# Patient Record
Sex: FEMALE | Race: ASIAN | NOT HISPANIC OR LATINO | Employment: FULL TIME | URBAN - METROPOLITAN AREA
[De-identification: names, ages, dates, MRNs, and addresses within clinical notes are randomized per-mention and may not be internally consistent; named-entity substitution may affect disease eponyms.]

---

## 2017-02-17 ENCOUNTER — OFFICE VISIT - HEALTHEAST (OUTPATIENT)
Dept: FAMILY MEDICINE | Facility: CLINIC | Age: 22
End: 2017-02-17

## 2017-02-17 DIAGNOSIS — M54.9 LEFT-SIDED BACK PAIN: ICD-10-CM

## 2017-02-17 DIAGNOSIS — D62 ACUTE BLOOD LOSS ANEMIA: ICD-10-CM

## 2017-02-17 ASSESSMENT — MIFFLIN-ST. JEOR: SCORE: 1429.01

## 2017-02-27 ENCOUNTER — COMMUNICATION - HEALTHEAST (OUTPATIENT)
Dept: FAMILY MEDICINE | Facility: CLINIC | Age: 22
End: 2017-02-27

## 2017-04-03 ENCOUNTER — AMBULATORY - HEALTHEAST (OUTPATIENT)
Dept: FAMILY MEDICINE | Facility: CLINIC | Age: 22
End: 2017-04-03

## 2017-04-03 ENCOUNTER — AMBULATORY - HEALTHEAST (OUTPATIENT)
Dept: NURSING | Facility: CLINIC | Age: 22
End: 2017-04-03

## 2017-04-03 DIAGNOSIS — Z11.1 SCREENING FOR TUBERCULOSIS: ICD-10-CM

## 2017-04-03 DIAGNOSIS — Z23 NEED FOR IMMUNIZATION AGAINST INFLUENZA: ICD-10-CM

## 2017-04-05 ENCOUNTER — AMBULATORY - HEALTHEAST (OUTPATIENT)
Dept: NURSING | Facility: CLINIC | Age: 22
End: 2017-04-05

## 2017-04-05 ENCOUNTER — COMMUNICATION - HEALTHEAST (OUTPATIENT)
Dept: FAMILY MEDICINE | Facility: CLINIC | Age: 22
End: 2017-04-05

## 2017-08-10 ENCOUNTER — COMMUNICATION - HEALTHEAST (OUTPATIENT)
Dept: FAMILY MEDICINE | Facility: CLINIC | Age: 22
End: 2017-08-10

## 2017-08-21 ENCOUNTER — PRENATAL OFFICE VISIT - HEALTHEAST (OUTPATIENT)
Dept: FAMILY MEDICINE | Facility: CLINIC | Age: 22
End: 2017-08-21

## 2017-08-21 DIAGNOSIS — Z32.01 POSITIVE PREGNANCY TEST: ICD-10-CM

## 2017-08-21 DIAGNOSIS — N92.6 ABNORMAL MENSES: ICD-10-CM

## 2017-08-21 DIAGNOSIS — Z34.90 NORMAL PREGNANCY: ICD-10-CM

## 2017-08-21 LAB — HIV 1+2 AB+HIV1 P24 AG SERPL QL IA: NEGATIVE

## 2017-08-21 ASSESSMENT — MIFFLIN-ST. JEOR: SCORE: 1465.29

## 2017-08-22 ENCOUNTER — HOSPITAL ENCOUNTER (OUTPATIENT)
Dept: ULTRASOUND IMAGING | Facility: HOSPITAL | Age: 22
Discharge: HOME | End: 2017-08-22
Attending: PHYSICIAN ASSISTANT

## 2017-08-22 DIAGNOSIS — Z34.90 NORMAL PREGNANCY: ICD-10-CM

## 2017-08-22 DIAGNOSIS — Z32.01 POSITIVE PREGNANCY TEST: ICD-10-CM

## 2017-08-22 LAB
HBV SURFACE AG SERPL QL IA: NEGATIVE
SYPHILIS RPR SCREEN - HISTORICAL: NORMAL

## 2017-08-23 ENCOUNTER — COMMUNICATION - HEALTHEAST (OUTPATIENT)
Dept: FAMILY MEDICINE | Facility: CLINIC | Age: 22
End: 2017-08-23

## 2017-08-26 ENCOUNTER — AMBULATORY - HEALTHEAST (OUTPATIENT)
Dept: FAMILY MEDICINE | Facility: CLINIC | Age: 22
End: 2017-08-26

## 2017-09-19 ENCOUNTER — PRENATAL OFFICE VISIT - HEALTHEAST (OUTPATIENT)
Dept: FAMILY MEDICINE | Facility: CLINIC | Age: 22
End: 2017-09-19

## 2017-09-19 DIAGNOSIS — Z34.92 SUPERVISION OF NORMAL PREGNANCY IN SECOND TRIMESTER: ICD-10-CM

## 2017-09-19 DIAGNOSIS — O99.891 ASYMPTOMATIC BACTERIURIA DURING PREGNANCY IN FIRST TRIMESTER: ICD-10-CM

## 2017-09-19 DIAGNOSIS — L30.9 ECZEMA: ICD-10-CM

## 2017-09-19 DIAGNOSIS — R82.71 ASYMPTOMATIC BACTERIURIA DURING PREGNANCY IN FIRST TRIMESTER: ICD-10-CM

## 2017-10-24 ENCOUNTER — HOSPITAL ENCOUNTER (OUTPATIENT)
Dept: ULTRASOUND IMAGING | Facility: HOSPITAL | Age: 22
Discharge: HOME | End: 2017-10-24
Attending: FAMILY MEDICINE

## 2017-10-24 ENCOUNTER — PRENATAL OFFICE VISIT - HEALTHEAST (OUTPATIENT)
Dept: FAMILY MEDICINE | Facility: CLINIC | Age: 22
End: 2017-10-24

## 2017-10-24 DIAGNOSIS — Z34.92 SUPERVISION OF NORMAL PREGNANCY IN SECOND TRIMESTER: ICD-10-CM

## 2017-10-24 DIAGNOSIS — Z34.82 ENCOUNTER FOR SUPERVISION OF OTHER NORMAL PREGNANCY IN SECOND TRIMESTER: ICD-10-CM

## 2017-11-21 ENCOUNTER — PRENATAL OFFICE VISIT - HEALTHEAST (OUTPATIENT)
Dept: FAMILY MEDICINE | Facility: CLINIC | Age: 22
End: 2017-11-21

## 2017-11-21 DIAGNOSIS — Z34.92 SUPERVISION OF NORMAL PREGNANCY IN SECOND TRIMESTER: ICD-10-CM

## 2017-12-22 ENCOUNTER — PRENATAL OFFICE VISIT - HEALTHEAST (OUTPATIENT)
Dept: FAMILY MEDICINE | Facility: CLINIC | Age: 22
End: 2017-12-22

## 2017-12-22 DIAGNOSIS — Z34.93 SUPERVISION OF NORMAL PREGNANCY IN THIRD TRIMESTER: ICD-10-CM

## 2017-12-23 LAB — SYPHILIS RPR SCREEN - HISTORICAL: NORMAL

## 2018-01-23 ENCOUNTER — PRENATAL OFFICE VISIT - HEALTHEAST (OUTPATIENT)
Dept: FAMILY MEDICINE | Facility: CLINIC | Age: 23
End: 2018-01-23

## 2018-01-23 DIAGNOSIS — Z34.93 SUPERVISION OF NORMAL PREGNANCY IN THIRD TRIMESTER: ICD-10-CM

## 2018-01-23 LAB
ALBUMIN UR-MCNC: NEGATIVE MG/DL
APPEARANCE UR: CLEAR
BILIRUB UR QL STRIP: NEGATIVE
COLOR UR AUTO: YELLOW
GLUCOSE UR STRIP-MCNC: NEGATIVE MG/DL
HGB UR QL STRIP: NEGATIVE
KETONES UR STRIP-MCNC: NEGATIVE MG/DL
LEUKOCYTE ESTERASE UR QL STRIP: ABNORMAL
NITRATE UR QL: NEGATIVE
PH UR STRIP: 7 [PH] (ref 5–8)
SP GR UR STRIP: 1.02 (ref 1–1.03)
UROBILINOGEN UR STRIP-ACNC: ABNORMAL

## 2018-02-13 ENCOUNTER — PRENATAL OFFICE VISIT - HEALTHEAST (OUTPATIENT)
Dept: FAMILY MEDICINE | Facility: CLINIC | Age: 23
End: 2018-02-13

## 2018-02-13 DIAGNOSIS — Z34.93 SUPERVISION OF NORMAL PREGNANCY IN THIRD TRIMESTER: ICD-10-CM

## 2018-02-13 LAB
ALBUMIN UR-MCNC: NEGATIVE MG/DL
APPEARANCE UR: CLEAR
BILIRUB UR QL STRIP: NEGATIVE
COLOR UR AUTO: YELLOW
GLUCOSE UR STRIP-MCNC: NEGATIVE MG/DL
HGB UR QL STRIP: NEGATIVE
KETONES UR STRIP-MCNC: NEGATIVE MG/DL
LEUKOCYTE ESTERASE UR QL STRIP: ABNORMAL
NITRATE UR QL: NEGATIVE
PH UR STRIP: 6.5 [PH] (ref 5–8)
SP GR UR STRIP: 1.02 (ref 1–1.03)
UROBILINOGEN UR STRIP-ACNC: ABNORMAL

## 2018-02-14 LAB
ALLERGIC TO PENICILLIN: NO
GP B STREP DNA SPEC QL NAA+PROBE: NEGATIVE

## 2018-02-27 ENCOUNTER — PRENATAL OFFICE VISIT - HEALTHEAST (OUTPATIENT)
Dept: FAMILY MEDICINE | Facility: CLINIC | Age: 23
End: 2018-02-27

## 2018-02-27 DIAGNOSIS — Z34.93 SUPERVISION OF NORMAL PREGNANCY IN THIRD TRIMESTER: ICD-10-CM

## 2018-02-27 LAB
ALBUMIN UR-MCNC: NEGATIVE MG/DL
APPEARANCE UR: CLEAR
BILIRUB UR QL STRIP: NEGATIVE
COLOR UR AUTO: YELLOW
GLUCOSE UR STRIP-MCNC: NEGATIVE MG/DL
HGB UR QL STRIP: NEGATIVE
KETONES UR STRIP-MCNC: NEGATIVE MG/DL
LEUKOCYTE ESTERASE UR QL STRIP: ABNORMAL
NITRATE UR QL: NEGATIVE
PH UR STRIP: 6 [PH] (ref 5–8)
SP GR UR STRIP: 1.02 (ref 1–1.03)
UROBILINOGEN UR STRIP-ACNC: ABNORMAL

## 2018-03-06 ENCOUNTER — PRENATAL OFFICE VISIT - HEALTHEAST (OUTPATIENT)
Dept: FAMILY MEDICINE | Facility: CLINIC | Age: 23
End: 2018-03-06

## 2018-03-06 DIAGNOSIS — Z34.93 SUPERVISION OF NORMAL PREGNANCY IN THIRD TRIMESTER: ICD-10-CM

## 2018-03-15 ENCOUNTER — COMMUNICATION - HEALTHEAST (OUTPATIENT)
Dept: FAMILY MEDICINE | Facility: CLINIC | Age: 23
End: 2018-03-15

## 2018-03-22 ENCOUNTER — PRENATAL OFFICE VISIT - HEALTHEAST (OUTPATIENT)
Dept: FAMILY MEDICINE | Facility: CLINIC | Age: 23
End: 2018-03-22

## 2018-03-22 DIAGNOSIS — Z34.93 SUPERVISION OF NORMAL PREGNANCY IN THIRD TRIMESTER: ICD-10-CM

## 2018-03-22 LAB
ALBUMIN UR-MCNC: ABNORMAL MG/DL
APPEARANCE UR: CLEAR
BILIRUB UR QL STRIP: NEGATIVE
COLOR UR AUTO: YELLOW
GLUCOSE UR STRIP-MCNC: NEGATIVE MG/DL
HGB UR QL STRIP: NEGATIVE
KETONES UR STRIP-MCNC: NEGATIVE MG/DL
LEUKOCYTE ESTERASE UR QL STRIP: ABNORMAL
NITRATE UR QL: NEGATIVE
PH UR STRIP: 6.5 [PH] (ref 5–8)
SP GR UR STRIP: 1.02 (ref 1–1.03)
UROBILINOGEN UR STRIP-ACNC: ABNORMAL

## 2018-03-23 ENCOUNTER — HOME CARE/HOSPICE - HEALTHEAST (OUTPATIENT)
Dept: HOME HEALTH SERVICES | Facility: HOME HEALTH | Age: 23
End: 2018-03-23

## 2018-03-25 ENCOUNTER — HOME CARE/HOSPICE - HEALTHEAST (OUTPATIENT)
Dept: HOME HEALTH SERVICES | Facility: HOME HEALTH | Age: 23
End: 2018-03-25

## 2018-05-07 ENCOUNTER — OFFICE VISIT - HEALTHEAST (OUTPATIENT)
Dept: FAMILY MEDICINE | Facility: CLINIC | Age: 23
End: 2018-05-07

## 2018-05-07 DIAGNOSIS — Z12.4 CERVICAL CANCER SCREENING: ICD-10-CM

## 2018-05-09 ENCOUNTER — OFFICE VISIT - HEALTHEAST (OUTPATIENT)
Dept: FAMILY MEDICINE | Facility: CLINIC | Age: 23
End: 2018-05-09

## 2018-05-09 DIAGNOSIS — Z30.017 NEXPLANON INSERTION: ICD-10-CM

## 2018-05-09 LAB
BKR LAB AP ABNORMAL BLEEDING: NO
BKR LAB AP BIRTH CONTROL/HORMONES: NORMAL
BKR LAB AP CERVICAL APPEARANCE: NORMAL
BKR LAB AP GYN ADEQUACY: NORMAL
BKR LAB AP GYN INTERPRETATION: NORMAL
BKR LAB AP HPV REFLEX: NORMAL
BKR LAB AP LMP: NORMAL
BKR LAB AP PATIENT STATUS: NORMAL
BKR LAB AP PREVIOUS ABNORMAL: NORMAL
BKR LAB AP PREVIOUS NORMAL: NORMAL
HCG UR QL: NEGATIVE
HIGH RISK?: NO
PATH REPORT.COMMENTS IMP SPEC: NORMAL
RESULT FLAG (HE HISTORICAL CONVERSION): NORMAL
SP GR UR STRIP: <=1.005 (ref 1–1.03)

## 2019-04-09 ENCOUNTER — COMMUNICATION - HEALTHEAST (OUTPATIENT)
Dept: FAMILY MEDICINE | Facility: CLINIC | Age: 24
End: 2019-04-09

## 2019-08-13 ENCOUNTER — OFFICE VISIT - HEALTHEAST (OUTPATIENT)
Dept: FAMILY MEDICINE | Facility: CLINIC | Age: 24
End: 2019-08-13

## 2019-08-13 DIAGNOSIS — Z30.46 NEXPLANON REMOVAL: ICD-10-CM

## 2019-08-13 ASSESSMENT — MIFFLIN-ST. JEOR: SCORE: 1537.08

## 2020-02-10 ENCOUNTER — OFFICE VISIT - HEALTHEAST (OUTPATIENT)
Dept: FAMILY MEDICINE | Facility: CLINIC | Age: 25
End: 2020-02-10

## 2020-02-10 DIAGNOSIS — M54.50 ACUTE MIDLINE LOW BACK PAIN WITHOUT SCIATICA: ICD-10-CM

## 2020-02-19 ENCOUNTER — OFFICE VISIT - HEALTHEAST (OUTPATIENT)
Dept: FAMILY MEDICINE | Facility: CLINIC | Age: 25
End: 2020-02-19

## 2020-02-19 DIAGNOSIS — Z11.8 SPECIAL SCREENING EXAMINATION FOR CHLAMYDIAL DISEASE: ICD-10-CM

## 2020-02-19 DIAGNOSIS — Z23 NEED FOR INFLUENZA VACCINATION: ICD-10-CM

## 2020-02-19 DIAGNOSIS — Z00.00 WELL ADULT EXAM: ICD-10-CM

## 2020-02-19 DIAGNOSIS — Z30.011 ENCOUNTER FOR INITIAL PRESCRIPTION OF CONTRACEPTIVE PILLS: ICD-10-CM

## 2020-02-19 LAB — HCG UR QL: NEGATIVE

## 2020-02-20 LAB
C TRACH DNA SPEC QL PROBE+SIG AMP: NEGATIVE
N GONORRHOEA DNA SPEC QL NAA+PROBE: NEGATIVE

## 2020-08-05 ENCOUNTER — PRENATAL OFFICE VISIT - HEALTHEAST (OUTPATIENT)
Dept: FAMILY MEDICINE | Facility: CLINIC | Age: 25
End: 2020-08-05

## 2020-08-05 DIAGNOSIS — N92.6 ABNORMAL MENSES: ICD-10-CM

## 2020-08-05 DIAGNOSIS — Z3A.08 8 WEEKS GESTATION OF PREGNANCY: ICD-10-CM

## 2020-08-05 DIAGNOSIS — Z32.01 PREGNANCY TEST POSITIVE: ICD-10-CM

## 2020-08-05 LAB
ALBUMIN UR-MCNC: NEGATIVE MG/DL
AMPHETAMINES UR QL SCN: NORMAL
BARBITURATES UR QL: NORMAL
BASOPHILS # BLD AUTO: 0 THOU/UL (ref 0–0.2)
BASOPHILS NFR BLD AUTO: 0 % (ref 0–2)
BENZODIAZ UR QL: NORMAL
CANNABINOIDS UR QL SCN: NORMAL
COCAINE UR QL: NORMAL
CREAT UR-MCNC: 41.3 MG/DL
EOSINOPHIL # BLD AUTO: 0.1 THOU/UL (ref 0–0.4)
EOSINOPHIL NFR BLD AUTO: 1 % (ref 0–6)
ERYTHROCYTE [DISTWIDTH] IN BLOOD BY AUTOMATED COUNT: 12.3 % (ref 11–14.5)
GLUCOSE UR STRIP-MCNC: NEGATIVE MG/DL
HCG UR QL: POSITIVE
HCT VFR BLD AUTO: 39 % (ref 35–47)
HGB BLD-MCNC: 12.8 G/DL (ref 12–16)
HIV 1+2 AB+HIV1 P24 AG SERPL QL IA: NEGATIVE
KETONES UR STRIP-MCNC: NEGATIVE MG/DL
LYMPHOCYTES # BLD AUTO: 2.2 THOU/UL (ref 0.8–4.4)
LYMPHOCYTES NFR BLD AUTO: 22 % (ref 20–40)
MCH RBC QN AUTO: 27.5 PG (ref 27–34)
MCHC RBC AUTO-ENTMCNC: 32.8 G/DL (ref 32–36)
MCV RBC AUTO: 84 FL (ref 80–100)
MONOCYTES # BLD AUTO: 0.8 THOU/UL (ref 0–0.9)
MONOCYTES NFR BLD AUTO: 8 % (ref 2–10)
NEUTROPHILS # BLD AUTO: 7.1 THOU/UL (ref 2–7.7)
NEUTROPHILS NFR BLD AUTO: 69 % (ref 50–70)
OPIATES UR QL SCN: NORMAL
OXYCODONE UR QL: NORMAL
PCP UR QL SCN: NORMAL
PLATELET # BLD AUTO: 267 THOU/UL (ref 140–440)
PMV BLD AUTO: 9.5 FL (ref 8.5–12.5)
RBC # BLD AUTO: 4.65 MILL/UL (ref 3.8–5.4)
WBC: 10.3 THOU/UL (ref 4–11)

## 2020-08-05 ASSESSMENT — MIFFLIN-ST. JEOR: SCORE: 1514.62

## 2020-08-06 ENCOUNTER — HOSPITAL ENCOUNTER (OUTPATIENT)
Dept: ULTRASOUND IMAGING | Facility: HOSPITAL | Age: 25
Discharge: HOME | End: 2020-08-06
Attending: PHYSICIAN ASSISTANT

## 2020-08-06 DIAGNOSIS — Z32.01 PREGNANCY TEST POSITIVE: ICD-10-CM

## 2020-08-06 DIAGNOSIS — Z3A.08 8 WEEKS GESTATION OF PREGNANCY: ICD-10-CM

## 2020-08-06 LAB
ABO/RH(D): NORMAL
ABORH REPEAT: NORMAL
ANTIBODY SCREEN: NEGATIVE
HBV SURFACE AG SERPL QL IA: NEGATIVE
RUBV IGG SERPL QL IA: POSITIVE
T PALLIDUM AB SER QL: NEGATIVE

## 2020-08-07 ENCOUNTER — AMBULATORY - HEALTHEAST (OUTPATIENT)
Dept: FAMILY MEDICINE | Facility: CLINIC | Age: 25
End: 2020-08-07

## 2020-08-07 DIAGNOSIS — N39.0 URINARY TRACT INFECTION WITHOUT HEMATURIA, SITE UNSPECIFIED: ICD-10-CM

## 2020-08-07 LAB
BACTERIA SPEC CULT: ABNORMAL
C TRACH DNA SPEC QL PROBE+SIG AMP: NEGATIVE
N GONORRHOEA DNA SPEC QL NAA+PROBE: NEGATIVE

## 2020-08-08 LAB
COLLECTION METHOD: NORMAL
LEAD BLD-MCNC: NORMAL UG/DL
LEAD BLDV-MCNC: <2 UG/DL

## 2020-08-10 ENCOUNTER — COMMUNICATION - HEALTHEAST (OUTPATIENT)
Dept: FAMILY MEDICINE | Facility: CLINIC | Age: 25
End: 2020-08-10

## 2020-08-16 ENCOUNTER — COMMUNICATION - HEALTHEAST (OUTPATIENT)
Dept: FAMILY MEDICINE | Facility: CLINIC | Age: 25
End: 2020-08-16

## 2020-09-07 ENCOUNTER — COMMUNICATION - HEALTHEAST (OUTPATIENT)
Dept: FAMILY MEDICINE | Facility: CLINIC | Age: 25
End: 2020-09-07

## 2020-09-21 ENCOUNTER — PRENATAL OFFICE VISIT - HEALTHEAST (OUTPATIENT)
Dept: FAMILY MEDICINE | Facility: CLINIC | Age: 25
End: 2020-09-21

## 2020-09-21 DIAGNOSIS — Z34.92 SUPERVISION OF NORMAL PREGNANCY IN SECOND TRIMESTER: ICD-10-CM

## 2020-09-21 DIAGNOSIS — Z34.80 SUPERVISION OF OTHER NORMAL PREGNANCY, ANTEPARTUM: ICD-10-CM

## 2020-09-21 DIAGNOSIS — Z34.02 SUPERVISION OF NORMAL FIRST PREGNANCY IN SECOND TRIMESTER: ICD-10-CM

## 2020-09-21 LAB
ALBUMIN UR-MCNC: ABNORMAL MG/DL
GLUCOSE UR STRIP-MCNC: NEGATIVE MG/DL
KETONES UR STRIP-MCNC: ABNORMAL MG/DL

## 2020-09-22 LAB — BACTERIA SPEC CULT: NO GROWTH

## 2020-10-02 ENCOUNTER — COMMUNICATION - HEALTHEAST (OUTPATIENT)
Dept: FAMILY MEDICINE | Facility: CLINIC | Age: 25
End: 2020-10-02

## 2020-10-19 ENCOUNTER — PRENATAL OFFICE VISIT - HEALTHEAST (OUTPATIENT)
Dept: FAMILY MEDICINE | Facility: CLINIC | Age: 25
End: 2020-10-19

## 2020-10-19 DIAGNOSIS — Z34.92 ENCOUNTER FOR SUPERVISION OF NORMAL PREGNANCY IN SECOND TRIMESTER: ICD-10-CM

## 2020-10-19 DIAGNOSIS — L30.9 ECZEMA, UNSPECIFIED TYPE: ICD-10-CM

## 2020-10-19 DIAGNOSIS — N89.8 VAGINAL DISCHARGE DURING PREGNANCY IN SECOND TRIMESTER: ICD-10-CM

## 2020-10-19 DIAGNOSIS — O26.892 VAGINAL DISCHARGE DURING PREGNANCY IN SECOND TRIMESTER: ICD-10-CM

## 2020-10-19 DIAGNOSIS — Z23 NEED FOR INFLUENZA VACCINATION: ICD-10-CM

## 2020-10-19 DIAGNOSIS — B37.31 YEAST INFECTION OF THE VAGINA: ICD-10-CM

## 2020-10-19 LAB
ALBUMIN UR-MCNC: NEGATIVE MG/DL
CLUE CELLS: ABNORMAL
GLUCOSE UR STRIP-MCNC: NEGATIVE MG/DL
KETONES UR STRIP-MCNC: NEGATIVE MG/DL
TRICHOMONAS, WET PREP: ABNORMAL
YEAST, WET PREP: ABNORMAL

## 2020-10-20 LAB
C TRACH DNA SPEC QL PROBE+SIG AMP: NEGATIVE
N GONORRHOEA DNA SPEC QL NAA+PROBE: NEGATIVE

## 2020-10-29 ENCOUNTER — HOSPITAL ENCOUNTER (OUTPATIENT)
Dept: ULTRASOUND IMAGING | Facility: HOSPITAL | Age: 25
Discharge: HOME | End: 2020-10-29
Attending: FAMILY MEDICINE

## 2020-10-29 DIAGNOSIS — Z34.92 ENCOUNTER FOR SUPERVISION OF NORMAL PREGNANCY IN SECOND TRIMESTER: ICD-10-CM

## 2020-11-11 ENCOUNTER — HOSPITAL ENCOUNTER (OUTPATIENT)
Dept: OBGYN | Facility: HOSPITAL | Age: 25
Discharge: HOME | End: 2020-11-11
Attending: FAMILY MEDICINE | Admitting: FAMILY MEDICINE

## 2020-11-11 ENCOUNTER — COMMUNICATION - HEALTHEAST (OUTPATIENT)
Dept: SCHEDULING | Facility: CLINIC | Age: 25
End: 2020-11-11

## 2020-11-11 DIAGNOSIS — O23.42 URINARY TRACT INFECTION IN MOTHER DURING SECOND TRIMESTER OF PREGNANCY: ICD-10-CM

## 2020-11-11 LAB
ALBUMIN UR-MCNC: NEGATIVE MG/DL
APPEARANCE UR: CLEAR
BACTERIA #/AREA URNS HPF: ABNORMAL HPF
BILIRUB UR QL STRIP: NEGATIVE
COLOR UR AUTO: YELLOW
GLUCOSE UR STRIP-MCNC: NEGATIVE MG/DL
HGB UR QL STRIP: ABNORMAL
KETONES UR STRIP-MCNC: NEGATIVE MG/DL
LEUKOCYTE ESTERASE UR QL STRIP: ABNORMAL
NITRATE UR QL: NEGATIVE
PH UR STRIP: 7 [PH] (ref 4.5–8)
RBC #/AREA URNS AUTO: ABNORMAL HPF
SP GR UR STRIP: 1 (ref 1–1.03)
SQUAMOUS #/AREA URNS AUTO: ABNORMAL LPF
UROBILINOGEN UR STRIP-ACNC: ABNORMAL
WBC #/AREA URNS AUTO: ABNORMAL HPF
WBC CLUMPS #/AREA URNS HPF: PRESENT /[HPF]

## 2020-11-11 ASSESSMENT — MIFFLIN-ST. JEOR: SCORE: 1510.65

## 2020-11-13 ENCOUNTER — COMMUNICATION - HEALTHEAST (OUTPATIENT)
Dept: FAMILY MEDICINE | Facility: CLINIC | Age: 25
End: 2020-11-13

## 2020-11-13 LAB — BACTERIA SPEC CULT: ABNORMAL

## 2020-11-16 ENCOUNTER — OFFICE VISIT - HEALTHEAST (OUTPATIENT)
Dept: FAMILY MEDICINE | Facility: CLINIC | Age: 25
End: 2020-11-16

## 2020-11-16 DIAGNOSIS — B37.31 YEAST INFECTION OF THE VAGINA: ICD-10-CM

## 2020-11-16 DIAGNOSIS — N30.01 ACUTE CYSTITIS WITH HEMATURIA: ICD-10-CM

## 2020-11-16 DIAGNOSIS — O23.42 URINARY TRACT INFECTION IN MOTHER DURING SECOND TRIMESTER OF PREGNANCY: ICD-10-CM

## 2020-12-14 ENCOUNTER — PRENATAL OFFICE VISIT - HEALTHEAST (OUTPATIENT)
Dept: FAMILY MEDICINE | Facility: CLINIC | Age: 25
End: 2020-12-14

## 2020-12-14 DIAGNOSIS — Z34.92 SUPERVISION OF NORMAL PREGNANCY IN SECOND TRIMESTER: ICD-10-CM

## 2020-12-14 LAB
ALBUMIN UR-MCNC: NEGATIVE MG/DL
FASTING STATUS PATIENT QL REPORTED: NO
GLUCOSE 1H P 50 G GLC PO SERPL-MCNC: 77 MG/DL (ref 70–139)
GLUCOSE UR STRIP-MCNC: NEGATIVE MG/DL
HGB BLD-MCNC: 11.6 G/DL (ref 12–16)
KETONES UR STRIP-MCNC: NEGATIVE MG/DL

## 2020-12-15 LAB — T PALLIDUM AB SER QL: NEGATIVE

## 2021-01-11 ENCOUNTER — PRENATAL OFFICE VISIT - HEALTHEAST (OUTPATIENT)
Dept: FAMILY MEDICINE | Facility: CLINIC | Age: 26
End: 2021-01-11

## 2021-01-11 DIAGNOSIS — Z34.93 THIRD TRIMESTER PREGNANCY: ICD-10-CM

## 2021-01-11 LAB
ALBUMIN UR-MCNC: NEGATIVE MG/DL
GLUCOSE UR STRIP-MCNC: NEGATIVE MG/DL
KETONES UR STRIP-MCNC: NEGATIVE MG/DL

## 2021-02-08 ENCOUNTER — PRENATAL OFFICE VISIT - HEALTHEAST (OUTPATIENT)
Dept: FAMILY MEDICINE | Facility: CLINIC | Age: 26
End: 2021-02-08

## 2021-02-08 DIAGNOSIS — B37.31 VAGINAL YEAST INFECTION: ICD-10-CM

## 2021-02-08 DIAGNOSIS — Z34.93 SUPERVISION OF NORMAL PREGNANCY IN THIRD TRIMESTER: ICD-10-CM

## 2021-02-08 LAB
ALBUMIN UR-MCNC: ABNORMAL MG/DL
CLUE CELLS: ABNORMAL
GLUCOSE UR STRIP-MCNC: NEGATIVE MG/DL
KETONES UR STRIP-MCNC: NEGATIVE MG/DL
TRICHOMONAS, WET PREP: ABNORMAL
YEAST, WET PREP: ABNORMAL

## 2021-02-11 ENCOUNTER — COMMUNICATION - HEALTHEAST (OUTPATIENT)
Dept: FAMILY MEDICINE | Facility: CLINIC | Age: 26
End: 2021-02-11

## 2021-02-16 ENCOUNTER — PRENATAL OFFICE VISIT - HEALTHEAST (OUTPATIENT)
Dept: FAMILY MEDICINE | Facility: CLINIC | Age: 26
End: 2021-02-16

## 2021-02-16 DIAGNOSIS — Z34.93 SUPERVISION OF NORMAL PREGNANCY IN THIRD TRIMESTER: ICD-10-CM

## 2021-02-16 LAB
ALBUMIN UR-MCNC: ABNORMAL MG/DL
GLUCOSE UR STRIP-MCNC: NEGATIVE MG/DL
KETONES UR STRIP-MCNC: NEGATIVE MG/DL

## 2021-02-23 ENCOUNTER — HOSPITAL ENCOUNTER (OUTPATIENT)
Dept: ULTRASOUND IMAGING | Facility: HOSPITAL | Age: 26
Discharge: HOME | End: 2021-02-23
Attending: FAMILY MEDICINE

## 2021-02-23 ENCOUNTER — PRENATAL OFFICE VISIT - HEALTHEAST (OUTPATIENT)
Dept: FAMILY MEDICINE | Facility: CLINIC | Age: 26
End: 2021-02-23

## 2021-02-23 DIAGNOSIS — Z34.93 SUPERVISION OF NORMAL PREGNANCY IN THIRD TRIMESTER: ICD-10-CM

## 2021-02-24 LAB
ALLERGIC TO PENICILLIN: NO
GP B STREP DNA SPEC QL NAA+PROBE: NEGATIVE

## 2021-03-02 ENCOUNTER — PRENATAL OFFICE VISIT - HEALTHEAST (OUTPATIENT)
Dept: FAMILY MEDICINE | Facility: CLINIC | Age: 26
End: 2021-03-02

## 2021-03-02 DIAGNOSIS — Z34.93 SUPERVISION OF NORMAL PREGNANCY IN THIRD TRIMESTER: ICD-10-CM

## 2021-03-03 ENCOUNTER — COMMUNICATION - HEALTHEAST (OUTPATIENT)
Dept: SCHEDULING | Facility: CLINIC | Age: 26
End: 2021-03-03

## 2021-03-09 ENCOUNTER — PRENATAL OFFICE VISIT - HEALTHEAST (OUTPATIENT)
Dept: FAMILY MEDICINE | Facility: CLINIC | Age: 26
End: 2021-03-09

## 2021-03-09 DIAGNOSIS — N10 ACUTE PYELONEPHRITIS IN THIRD TRIMESTER, ANTEPARTUM: ICD-10-CM

## 2021-03-09 DIAGNOSIS — Z34.93 ENCOUNTER FOR SUPERVISION OF NORMAL PREGNANCY IN THIRD TRIMESTER: ICD-10-CM

## 2021-03-09 DIAGNOSIS — O23.03 ACUTE PYELONEPHRITIS IN THIRD TRIMESTER, ANTEPARTUM: ICD-10-CM

## 2021-03-09 DIAGNOSIS — Z09 HOSPITAL DISCHARGE FOLLOW-UP: ICD-10-CM

## 2021-03-09 LAB
ALBUMIN UR-MCNC: NEGATIVE G/DL
GLUCOSE UR STRIP-MCNC: NEGATIVE MG/DL
KETONES UR STRIP-MCNC: NEGATIVE MG/DL

## 2021-03-16 ENCOUNTER — PRENATAL OFFICE VISIT - HEALTHEAST (OUTPATIENT)
Dept: FAMILY MEDICINE | Facility: CLINIC | Age: 26
End: 2021-03-16

## 2021-03-16 DIAGNOSIS — Z34.93 ENCOUNTER FOR SUPERVISION OF NORMAL PREGNANCY IN THIRD TRIMESTER: ICD-10-CM

## 2021-03-22 ENCOUNTER — COMMUNICATION - HEALTHEAST (OUTPATIENT)
Dept: SCHEDULING | Facility: CLINIC | Age: 26
End: 2021-03-22

## 2021-03-26 ENCOUNTER — RECORDS - HEALTHEAST (OUTPATIENT)
Dept: ADMINISTRATIVE | Facility: OTHER | Age: 26
End: 2021-03-26

## 2021-05-04 ENCOUNTER — OFFICE VISIT - HEALTHEAST (OUTPATIENT)
Dept: FAMILY MEDICINE | Facility: CLINIC | Age: 26
End: 2021-05-04

## 2021-05-04 DIAGNOSIS — Z12.4 SCREENING FOR MALIGNANT NEOPLASM OF CERVIX: ICD-10-CM

## 2021-05-04 ASSESSMENT — MIFFLIN-ST. JEOR: SCORE: 1488.32

## 2021-05-04 ASSESSMENT — EDINBURGH POSTNATAL DEPRESSION SCALE (EPDS): TOTAL SCORE: 0

## 2021-05-05 LAB
BKR LAB AP ABNORMAL BLEEDING: NO
BKR LAB AP BIRTH CONTROL/HORMONES: NORMAL
BKR LAB AP CERVICAL APPEARANCE: NORMAL
BKR LAB AP GYN ADEQUACY: NORMAL
BKR LAB AP GYN INTERPRETATION: NORMAL
BKR LAB AP HPV REFLEX: NORMAL
BKR LAB AP LMP: NORMAL
BKR LAB AP PATIENT STATUS: NORMAL
BKR LAB AP PREVIOUS ABNORMAL: NORMAL
BKR LAB AP PREVIOUS NORMAL: 2018
HIGH RISK?: NO
PATH REPORT.COMMENTS IMP SPEC: NORMAL
RESULT FLAG (HE HISTORICAL CONVERSION): NORMAL

## 2021-05-27 VITALS
WEIGHT: 179.25 LBS | DIASTOLIC BLOOD PRESSURE: 50 MMHG | SYSTOLIC BLOOD PRESSURE: 90 MMHG | TEMPERATURE: 98.3 F | HEIGHT: 60 IN | BODY MASS INDEX: 35.19 KG/M2 | HEART RATE: 60 BPM

## 2021-05-30 VITALS — HEIGHT: 60 IN | BODY MASS INDEX: 32.79 KG/M2 | WEIGHT: 167 LBS

## 2021-05-31 VITALS — BODY MASS INDEX: 35.94 KG/M2 | WEIGHT: 184 LBS

## 2021-05-31 VITALS — WEIGHT: 175 LBS | BODY MASS INDEX: 34.18 KG/M2

## 2021-05-31 VITALS — WEIGHT: 175 LBS | HEIGHT: 60 IN | BODY MASS INDEX: 34.36 KG/M2

## 2021-05-31 VITALS — BODY MASS INDEX: 35.15 KG/M2 | WEIGHT: 180 LBS

## 2021-05-31 VITALS — WEIGHT: 176 LBS | BODY MASS INDEX: 34.37 KG/M2

## 2021-05-31 VITALS — WEIGHT: 172 LBS | BODY MASS INDEX: 33.59 KG/M2

## 2021-05-31 NOTE — PROGRESS NOTES
Subjective:      Davey Rooney is a 23 y.o. female who presents for evaluation of Nexplanon removal.  Nexplanon placed 5/9/2018.  She would like it removed because she feels like she is gaining weight.  Wt Readings from Last 3 Encounters:   08/13/19 190 lb (86.2 kg)   05/09/18 167 lb (75.8 kg)   05/07/18 167 lb (75.8 kg)   Additionally, she feels like Nexplanon is giving her increased acne.  She only has very light periods.  She does not want to use any other birth control at this time.  She notes some occasional shakiness or feeling a bit lightheaded recently.  It seems to happen more in the afternoons.  Usually seems to happen when she skips breakfast in the morning.    Patient Active Problem List   Diagnosis     Rubella non-immune status, antepartum     Nexplanon insertion (5/9/18)       Current Outpatient Medications:      etonogestrel (NEXPLANON SDRM), by Subdermal route. Inserted 5/9/2018 Due for removal: 05/09/2021, Disp: , Rfl:      Objective:     Allergies:  Patient has no known allergies.    Vitals:  Vitals:    08/13/19 0935   BP: 122/69   Pulse: 64   Resp: 16     Body mass index is 36.82 kg/m .    Vital signs reviewed.  General: Patient is alert and oriented x 3, in no apparent distress    Procedure:  Left upper inner arm was adequately anesthetized with 2.5 cc of lidocaine with Epi.  Then, using sterile technique, 5 mm incision was made with an 11 blade.  Nexplanon was palpated subcutaneously and removed with a hemostat clamp.  Incision site was closed with steri-strips and wrapped with a pressure bandage.  Patient was neurovascularly intact after exam.  Proper wound aftercare was dicussed with patient.    Assessment and Plan:   1.  Nexplanon removal.  Patient declines other offers birth control today.  She is aware it is possible to become pregnant as soon as Nexplanon is removed.  Symptoms reported in HPI sound like mild hypoglycemia.  Patient will continue to monitor.  I encouraged her to eat breakfast  daily.  Also discussed possibility of having a snack earlier in the afternoon.  Would have low suspicion for anemia given only light periods with Nexplanon.  Continue to monitor and follow-up as needed.    This dictation uses voice recognition software, which may contain typographical errors.

## 2021-06-01 VITALS — BODY MASS INDEX: 37.5 KG/M2 | WEIGHT: 192 LBS

## 2021-06-01 VITALS — WEIGHT: 167 LBS | BODY MASS INDEX: 32.61 KG/M2

## 2021-06-01 VITALS — WEIGHT: 186 LBS | BODY MASS INDEX: 36.33 KG/M2

## 2021-06-01 VITALS — WEIGHT: 190 LBS | BODY MASS INDEX: 37.11 KG/M2

## 2021-06-01 VITALS — BODY MASS INDEX: 36.91 KG/M2 | WEIGHT: 189 LBS

## 2021-06-03 VITALS — WEIGHT: 190 LBS | BODY MASS INDEX: 37.3 KG/M2 | HEIGHT: 60 IN

## 2021-06-04 VITALS
DIASTOLIC BLOOD PRESSURE: 72 MMHG | HEART RATE: 72 BPM | BODY MASS INDEX: 36.62 KG/M2 | WEIGHT: 189 LBS | SYSTOLIC BLOOD PRESSURE: 122 MMHG | TEMPERATURE: 98.2 F | RESPIRATION RATE: 16 BRPM

## 2021-06-04 VITALS
DIASTOLIC BLOOD PRESSURE: 72 MMHG | BODY MASS INDEX: 37.23 KG/M2 | WEIGHT: 192.13 LBS | OXYGEN SATURATION: 98 % | SYSTOLIC BLOOD PRESSURE: 110 MMHG | TEMPERATURE: 98 F | RESPIRATION RATE: 18 BRPM | HEART RATE: 64 BPM

## 2021-06-04 VITALS
HEART RATE: 78 BPM | BODY MASS INDEX: 36.32 KG/M2 | DIASTOLIC BLOOD PRESSURE: 79 MMHG | SYSTOLIC BLOOD PRESSURE: 115 MMHG | WEIGHT: 185 LBS | HEIGHT: 60 IN

## 2021-06-05 VITALS
WEIGHT: 194 LBS | HEART RATE: 72 BPM | DIASTOLIC BLOOD PRESSURE: 64 MMHG | BODY MASS INDEX: 37.89 KG/M2 | SYSTOLIC BLOOD PRESSURE: 100 MMHG

## 2021-06-05 VITALS
BODY MASS INDEX: 39.01 KG/M2 | SYSTOLIC BLOOD PRESSURE: 114 MMHG | DIASTOLIC BLOOD PRESSURE: 74 MMHG | HEART RATE: 72 BPM | WEIGHT: 199.75 LBS

## 2021-06-05 VITALS
DIASTOLIC BLOOD PRESSURE: 69 MMHG | BODY MASS INDEX: 38.86 KG/M2 | WEIGHT: 199 LBS | SYSTOLIC BLOOD PRESSURE: 106 MMHG | HEART RATE: 86 BPM

## 2021-06-05 VITALS
WEIGHT: 189 LBS | HEART RATE: 92 BPM | DIASTOLIC BLOOD PRESSURE: 50 MMHG | SYSTOLIC BLOOD PRESSURE: 94 MMHG | BODY MASS INDEX: 36.91 KG/M2

## 2021-06-05 VITALS
SYSTOLIC BLOOD PRESSURE: 102 MMHG | WEIGHT: 185 LBS | BODY MASS INDEX: 35.83 KG/M2 | HEART RATE: 76 BPM | DIASTOLIC BLOOD PRESSURE: 56 MMHG

## 2021-06-05 VITALS
WEIGHT: 199.25 LBS | HEART RATE: 80 BPM | SYSTOLIC BLOOD PRESSURE: 105 MMHG | DIASTOLIC BLOOD PRESSURE: 45 MMHG | BODY MASS INDEX: 38.91 KG/M2

## 2021-06-05 VITALS
DIASTOLIC BLOOD PRESSURE: 67 MMHG | HEART RATE: 75 BPM | WEIGHT: 200 LBS | BODY MASS INDEX: 39.06 KG/M2 | SYSTOLIC BLOOD PRESSURE: 110 MMHG

## 2021-06-05 VITALS
DIASTOLIC BLOOD PRESSURE: 60 MMHG | HEART RATE: 100 BPM | SYSTOLIC BLOOD PRESSURE: 96 MMHG | WEIGHT: 185 LBS | BODY MASS INDEX: 35.83 KG/M2

## 2021-06-05 VITALS
DIASTOLIC BLOOD PRESSURE: 65 MMHG | SYSTOLIC BLOOD PRESSURE: 112 MMHG | WEIGHT: 192.13 LBS | BODY MASS INDEX: 37.52 KG/M2 | HEART RATE: 86 BPM

## 2021-06-05 VITALS
BODY MASS INDEX: 39.06 KG/M2 | WEIGHT: 200 LBS | TEMPERATURE: 99.9 F | SYSTOLIC BLOOD PRESSURE: 135 MMHG | DIASTOLIC BLOOD PRESSURE: 82 MMHG | HEART RATE: 118 BPM

## 2021-06-05 VITALS — HEIGHT: 60 IN | WEIGHT: 185 LBS | BODY MASS INDEX: 36.32 KG/M2

## 2021-06-05 NOTE — PATIENT INSTRUCTIONS - HE
Prednisone 2 tablets every morning with breakfast    Take ibuprofen 800mg every 8 hours for next 2-3 days and then as needed    Take tyelnol between ibuprofen as needed for pain    Can use icy hot patches on the area of pain as needed    Ice/heat    Flexeril every 8 hours as needed for next 2-3 days and then at bedtime    Jane Dillon Chiropractor  [phone]  [address] [place] [address]

## 2021-06-05 NOTE — PROGRESS NOTES
[facility] Office Visit    Chief Complaint:  Chief Complaint   Patient presents with     Back Pain     low back started this morning, no injury pain scale 9, difficult to walk     Paperwork     return to work release         Assessment/Plan:  1. Acute midline low back pain without sciatica  Pt looks uncomfortable but no warning or red flag signs.  Okay to procede with conservative tx as outlined below. Pt due for cpe scheduled next week to recheck on pain and review work restrictions.    - predniSONE (DELTASONE) 20 MG tablet; Take 40 mg by mouth daily with breakfast for 5 days.  Dispense: 10 tablet; Refill: 0  - ibuprofen (ADVIL,MOTRIN) 800 MG tablet; Take 1 tablet (800 mg total) by mouth every 8 (eight) hours as needed for pain.  Dispense: 60 tablet; Refill: 0  - cyclobenzaprine (FLEXERIL) 10 MG tablet; Take 1 tablet (10 mg total) by mouth 3 (three) times a day as needed for muscle spasms.  Dispense: 30 tablet; Refill: 0      Return in about 1 week (around 2/17/2020) for Annual physical.    Patient Education/AVS:  Patient Instructions   Prednisone 2 tablets every morning with breakfast    Take ibuprofen 800mg every 8 hours for next 2-3 days and then as needed    Take tyelnol between ibuprofen as needed for pain    Can use icy hot patches on the area of pain as needed    Ice/heat    Flexeril every 8 hours as needed for next 2-3 days and then at bedtime    Jane Dillon Chiropractor  [phone]  [facility]        HPI:   Davey Rooney is a 24 y.o. female c/o acute low back pain that started this morning while at work lifting a heavy metal lid.  9/10. Worse with movement.  Tried ice and ibuprofen.  No headaches or visual changes.  No numbness/tingling or pain down the legs.  No weakness.  No bowel or bladder incontinence or problems.  Works 10hrs a day 4 days/week- lifting, sitting, standing, .  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      ROS:  Constitutional, ENT, CV, Resp, GI,  , MSK, skin, neuro, psych all negative except as outlined in the HPI above and patient denies any other symptoms.      Old Records-1: Outside allergies, meds, problems and immunizations were reconciled as needed    Health Maintenance reviewed and ordered as appropriate as part of shared decision making with patient.     Physical Exam:  /72 (Patient Site: Right Arm, Patient Position: Sitting, Cuff Size: Adult Regular)   Pulse 72   Temp 98.2  F (36.8  C) (Oral)   Resp 16   Wt 189 lb (85.7 kg)   LMP 01/20/2020 (Within Days)   BMI 36.62 kg/m   Body mass index is 36.62 kg/m . Patient's last menstrual period was 01/20/2020 (within days).  Vital signs reviewed  Wt Readings from Last 3 Encounters:   02/10/20 189 lb (85.7 kg)   08/13/19 190 lb (86.2 kg)   05/09/18 167 lb (75.8 kg)     Social History     Tobacco Use   Smoking Status Never Smoker   Smokeless Tobacco Never Used     Social History     Substance and Sexual Activity   Sexual Activity Yes     Partners: Male     Birth control/protection: None     No data recorded  No data recorded  PHQ-2 Total Score: 0 (8/13/2019  9:39 AM)    No data recorded    All normal as below except abnormalities include: painful in paraspinal muscles bilaterally to palpation.  Pt moving slowly- accompanied by her  and 2 young kids as she was not able to drive due to back pain.  Difficulty standing from seated position and holds her lower back.  Prefers to stand leaning forward a 30 degrees. Able to forward flex 90 degrees but does note pain.  Lateral bending and extension wnl but pt does note pain.  Normal inspection of lower back- no redness warmth or swelling noted.  Able to heel and toe walk with good strength.    General is a  24 y.o. female sitting comfortably in no apparent distress.   Neck: Supple without lymphadenopathy or thyromegally  CV: Regular rate and rhythm S1S2 without rubs, murmurs or gallops,   Lungs: Clear to auscultation bilaterally  Abd:  +BS, soft  NT/ND,  No masses or organomegally  Extremities: Warm, No Edema, 2+ Pedal and radial pulses bilaterally  Skin: No lesions or rashes noted  Neuro/MSK: Able to ambulate around the exam room with equal movement, strength and normal coordination of the upper and lower extremeties symmetrically    Results for orders placed or performed in visit on 05/09/18   Pregnancy (Beta-hCG, Qual), Urine   Result Value Ref Range    Pregnancy Test, Urine Negative Negative    Specific Gravity, UA <=1.005 1.001 - 1.030       Med list and active problem list reviewed and updated as part of this encounter    No current outpatient medications on file prior to visit.     No current facility-administered medications on file prior to visit.          Cristal De La Cruz MD

## 2021-06-06 NOTE — PROGRESS NOTES
Adult Physical:    CC:  cpe    HPI:  Wants to get back on contraception  Had Nexplanon  Had it removed due to acne  Acne improve  Wants to try OCP.    Recently seen for back pain  It is improving.  Still some burning back pain.  No fevers or chills.   No incontinence or weakness in limbs  No radiation to legs.    Patient Active Problem List   Diagnosis     Rubella non-immune status, antepartum     Past Medical History:  Past Medical History:   Diagnosis Date     Asymptomatic bacteriuria during pregnancy 10/27/2015     Chorioamnionitis, delivered, current hospitalization 5/19/2016     Miscarriage 6/2014     Miscarriage 8/2015     Vacuum extractor delivery, delivered 5/19/2016     Past Surgical History:  No past surgical history on file.    Medicines:  Outpatient Medications Prior to Visit   Medication Sig Dispense Refill     cyclobenzaprine (FLEXERIL) 10 MG tablet Take 1 tablet (10 mg total) by mouth 3 (three) times a day as needed for muscle spasms. 30 tablet 0     ibuprofen (ADVIL,MOTRIN) 800 MG tablet Take 1 tablet (800 mg total) by mouth every 8 (eight) hours as needed for pain. 60 tablet 0     No facility-administered medications prior to visit.      Allergies:  No Known Allergies    Family History:  Family History   Problem Relation Age of Onset     Diabetes Mother      Lung disease Father      Diabetes Sister      No Medical Problems Brother      No Medical Problems Sister      No Medical Problems Brother      No Medical Problems Brother      No Medical Problems Brother      No Medical Problems Brother      No Medical Problems Brother      No Medical Problems Brother      Social History:  Social History     Socioeconomic History     Marital status: Single     Spouse name: Not on file     Number of children: Not on file     Years of education: Not on file     Highest education level: Not on file   Occupational History     Not on file   Social Needs     Financial resource strain: Not on file     Food insecurity:      Worry: Not on file     Inability: Not on file     Transportation needs:     Medical: Not on file     Non-medical: Not on file   Tobacco Use     Smoking status: Never Smoker     Smokeless tobacco: Never Used   Substance and Sexual Activity     Alcohol use: No     Drug use: No     Sexual activity: Yes     Partners: Male     Birth control/protection: None   Lifestyle     Physical activity:     Days per week: Not on file     Minutes per session: Not on file     Stress: Not on file   Relationships     Social connections:     Talks on phone: Not on file     Gets together: Not on file     Attends Voodoo service: Not on file     Active member of club or organization: Not on file     Attends meetings of clubs or organizations: Not on file     Relationship status: Not on file     Intimate partner violence:     Fear of current or ex partner: Not on file     Emotionally abused: Not on file     Physically abused: Not on file     Forced sexual activity: Not on file   Other Topics Concern     Not on file   Social History Narrative     Not on file     Review of Systems:  10 poin review is normal except for back pain issues.    Physical Exam:  /72 (Patient Site: Left Arm, Patient Position: Sitting, Cuff Size: Adult Large)   Pulse 64   Temp 98  F (36.7  C) (Oral)   Resp 18   Wt 192 lb 2 oz (87.1 kg)   LMP 01/20/2020 (Within Days)   SpO2 98%   BMI 37.23 kg/m    Gen:   Alert, not distressed  Head:   Normocephalic, without obvious abnormality, atraumatic  Eyes:   PERRL, conjunctiva/corneas clear, EOM's intact  Ears:   Normal tympanic membranes and external ear canals  Nose:    Mucosa normal, no drainage or sinus tenderness  Throat:    No erythema or exudates  Neck:    No adenopathy no nodules in thyroid, normal ROM  Lungs:    Clear to auscultation bilaterally, respirations unlabored  Chest wall:  No tenderness or deformity  Heart:     Regular, normal S1 and S2, no murmur, gallop or rub  Abdomen:  Soft, non-tender, no  masses, no organomegaly  Back:    Symmetric, no curvature, ROM normal, no CVA tenderness  Extremities: Extremities normal, atraumatic, no cyanosis or edema  Skin:   Skin color, texture, turgor normal, no rashes or lesions  Lymph nodes: Cervical, and supraclavicular, nodes normal  Neurologic: CNII-XII intact.   DTRs normal and symmetric.  Symmetric strength and sensation.    Recent Results (from the past 24 hour(s))   Pregnancy (Beta-hCG, Qual), Urine   Result Value Ref Range    Pregnancy Test, Urine Negative Negative      Immunizations Due:  FLU: ordered    Tobacco Cessation:   n/a    Weight management:   The patient was counseled regarding nutrition and physical activity    Assessment and Plan:  1. Well adult exam    2. Encounter for initial prescription of contraceptive pills  Risks, benefits, alternatives and side effects of OCPs discussed.  - levonorgestrel-ethinyl estradiol (AVIANE,ALESSE,LESSINA) 0.1-20 mg-mcg per tablet; Take 1 tablet by mouth daily.  Dispense: 84 tablet; Refill: 3    3. Need for influenza vaccination  - Influenza, Seasonal Quad, PF =/> 6months    4. Special screening examination for chlamydial disease  - Chlamydia trachomatis & Neisseria gonorrhoeae, Amplified Detection

## 2021-06-09 NOTE — PROGRESS NOTES
Pt came in today for nurse visit only. Pt tolerated well.       Nixon Silver, BEBA/CA  [facility]

## 2021-06-10 NOTE — TELEPHONE ENCOUNTER
"Called and left message for pt to return call.# 3  \" Okay to relay message\"  Called the pharmacy and spoke with staff patient has not  rx.       " PAIN SCALE 10 OF 10.

## 2021-06-10 NOTE — TELEPHONE ENCOUNTER
----- Message from Tyra Segura MD sent at 8/7/2020  4:48 PM CDT -----  Please let patient know she has a bladder infection.  I'll send in a prescription for her.  She should check with her pharmacy.  Will treat with Amoxicillin.

## 2021-06-10 NOTE — PROGRESS NOTES
Chief Complaint:  Chief Complaint   Patient presents with     preg conf     dr. syed 3rd preg.     HPI:   Davey Rooney is a 24 y.o. female is here for pregnancy intake.  She is .  Patient's last menstrual period was 06/10/2020.  Positive home pregnancy test 3 weeks ago.  Some nausea and vomiting, decreased appetite.    Past medical history: reviewed and updated.  Past Medical History:   Diagnosis Date     Asymptomatic bacteriuria during pregnancy 10/27/2015     Chorioamnionitis, delivered, current hospitalization 2016     Miscarriage 2014     Miscarriage 2015     Urinary tract infection      Vacuum extractor delivery, delivered 2016     No past surgical history on file.    Current Outpatient Medications:      amoxicillin (AMOXIL) 500 MG tablet, Take 1 tablet (500 mg total) by mouth 3 (three) times a day for 7 days. May substitute capsules, Disp: 21 tablet, Rfl: 0     prenatal vitamin gummy (PRENATAL GUMMY) 400 mcg-35 mg -25 mg-5 mg Chew, Chew 1 each daily., Disp: 100 tablet, Rfl: 3    Social:  Social History     Tobacco Use     Smoking status: Never Smoker     Smokeless tobacco: Never Used   Substance Use Topics     Alcohol use: No     Drug use: No       OBJECTIVE:  No Known Allergies  Vitals:    20 1334   BP: 115/79   Pulse: 78     Body mass index is 35.83 kg/m .    Vital signs stable as recorded above  General: Patient is alert and oriented x 3, in no apparent distress  Remainder of physical exam deferred to patient's First OB Visit.    Results:  Results for orders placed or performed in visit on 20   Culture, Urine    Specimen: Urine, Clean Catch   Result Value Ref Range    Culture >100,000 col/ml Escherichia coli (!)        Susceptibility    Escherichia coli - ANUPAMA     Amoxicillin + Clavulanate <=4/2 Sensitive      Ampicillin <=4 Sensitive      Cefazolin <=1 Sensitive      Cefepime <=1 Sensitive      Ceftriaxone <=1 Sensitive      Ciprofloxacin <=0.5 Sensitive      Gentamicin <=2  Sensitive      Levofloxacin <=1 Sensitive      Meropenem <=0.25 Sensitive      Nitrofurantoin <=16 Sensitive      Tetracycline <=2 Sensitive      Tobramycin <=2 Sensitive      Trimethoprim + Sulfamethoxazole <=0.5 Sensitive    Chlamydia/gonorrhoeae, URINE    Specimen: Urine, Voided; Body Fluid   Result Value Ref Range    Chlamydia trachomatis, Amplified Detection Negative Negative    Neisseria gonorrhoeae, Amplified Detection Negative Negative   Pregnancy (Beta-hCG, Qual), Urine   Result Value Ref Range    Pregnancy Test, Urine Positive (!) Negative   ABO/RH Typing (OP order)   Result Value Ref Range    HML ABO/Rh Typing O POS     HML ABO/Rh Repeat Typing O POS    Hepatitis B Surface antigen (HBsAG)   Result Value Ref Range    Hepatitis B Surface Ag Negative Negative   HIV Antigen/Antibody Screening Cascade   Result Value Ref Range    HIV Antigen / Antibody Negative Negative   HML Antibody Screen   Result Value Ref Range    HML Antibody Screen Negative Negative   Lead, Blood   Result Value Ref Range    Lead      Collection Method Venous    RPR   Result Value Ref Range    Treponema Antibody (Syphilis) Negative Negative   Rubella Immune Status (IgG)   Result Value Ref Range    Rubella Antibody, IgG Positive    Drugs of Abuse 1,Urine   Result Value Ref Range    Amphetamines Screen Negative Screen Negative    Benzodiazepines Screen Negative Screen Negative    Opiates Screen Negative Screen Negative    Phencyclidine Screen Negative Screen Negative    THC Screen Negative Screen Negative    Barbiturates Screen Negative Screen Negative    Cocaine Metabolite Screen Negative Screen Negative    Oxycodone Screen Negative Screen Negative    Creatinine, Urine 41.3 mg/dL   Urinalysis, OB Screen   Result Value Ref Range    Glucose, UA Negative Negative    Ketones, UA Negative Negative    Protein, UA Negative Negative mg/dL   HM1 (CBC with Diff)   Result Value Ref Range    WBC 10.3 4.0 - 11.0 thou/uL    RBC 4.65 3.80 - 5.40 mill/uL     Hemoglobin 12.8 12.0 - 16.0 g/dL    Hematocrit 39.0 35.0 - 47.0 %    MCV 84 80 - 100 fL    MCH 27.5 27.0 - 34.0 pg    MCHC 32.8 32.0 - 36.0 g/dL    RDW 12.3 11.0 - 14.5 %    Platelets 267 140 - 440 thou/uL    MPV 9.5 8.5 - 12.5 fL    Neutrophils % 69 50 - 70 %    Lymphocytes % 22 20 - 40 %    Monocytes % 8 2 - 10 %    Eosinophils % 1 0 - 6 %    Basophils % 0 0 - 2 %    Neutrophils Absolute 7.1 2.0 - 7.7 thou/uL    Lymphocytes Absolute 2.2 0.8 - 4.4 thou/uL    Monocytes Absolute 0.8 0.0 - 0.9 thou/uL    Eosinophils Absolute 0.1 0.0 - 0.4 thou/uL    Basophils Absolute 0.0 0.0 - 0.2 thou/uL   Lead, Blood, Venous   Result Value Ref Range    Lead, Blood (Venous) <2.0 <=4.9 ug/dL     Other screening pregnancy lab work is ordered and pending.    Patient scored a 11/30 on [facility]  Depression Screen.    Assessment and Plan:  1. Pregnancy Intake Appointment.  Davey Rooney is 24 y.o. and .  Patient's last menstrual period was 06/10/2020.  Estimated Date of Delivery: 3/17/21  She will be seeing Dr. Bass for OB care.  Screening pregnancy lab work was drawn.  Prenatal vitamins prescribed.  Problem list and current medications reviewed and updated.  Dating ultrasound ordered.  Prenatal info packet given.  First trimester genetic screening test was discussed with patient.    She declines Nuchal translucency ultrasound.      Follow up in 6 weeks.  Please see OB Episode for complete details.    This dictation uses voice recognition software, which may contain typographical errors.

## 2021-06-10 NOTE — PATIENT INSTRUCTIONS - HE
Pregnancy: 8 weeks    Due Date: March 17, 2021    Next visit in 6 weeks  With Dr. Bass  For Your First OB Visit

## 2021-06-10 NOTE — TELEPHONE ENCOUNTER
Attempt to call emergency contact no answer.   Attempted to call boyfriend on CTC, No answer.   Letter created and sent.

## 2021-06-11 NOTE — PATIENT INSTRUCTIONS - HE
"  Patient Education   HEALTHY PREGNANCY CARE: 14 to 18 WEEKS PREGNANT    During this time, you may start to \"show,\" so that you look pregnant to people around you. You may also notice some changes in your skin, such as an increase in acne on your face. You may notice your heart pounding, a sharp stretching ache on either side of your lower abdomen (round ligament), and more vaginal discharge.     Your baby's nerves and muscles are maturing. Sex organs are recognizable. Your baby is now able to pass urine, and your baby's first stool (meconium) is starting to collect in his or her intestines. Hair is also beginning to grow on your baby's head. Your baby is moving freely inside your uterus but you may not be able to feel it until 18-20 weeks.    Continue making healthy choices for your baby during pregnancy, including good nutrition, exercise and a safe environment free from smoking, alcohol and drugs.    Your genetic screening with a quad screen blood test may have been done today.    Watch for warning signs and contact your midwife or physician at the clinic with any concerns at [facility]/OB at Phone: [phone]. For example, call about cramping, bleeding, abdominal pain, watery vaginal discharge, or if you are unable to keep fluids down for more than 24 hours due to vomiting.   If it's after clinic hours, physician patients should call the [facility] at [phone] ([phone]); midwife patients should call their answering service at [phone].    How can you care for yourself at home?   You can refer to the Starting Out Right book or find it online at [email or web address] or [email or web address]/[email or web address]-[email or web address]-[email or web address]-starting-[email or web address]-right.html#p=8     You can sign up for a weekly parenting e-mail that gives support, tips and advice from health care professionals that starts " with pregnancy and continues through the toddler years. To register, go to [email or web address] at any time during your pregnancy.

## 2021-06-11 NOTE — PROGRESS NOTES
Discussed screening for aneuploidy and neural tube defects. After counseling, she wanted NIPT.    Carrier screening for SMA, CF, thalassemia, fragile X discussed.  Patient declines.    Reviewed intake exam, labs, SD, past medical history, past surgical history, family history, genetic history, and previous obstetrical history.     Preeclampsia risk factors:  -High risk factors (1+): NONE  -Moderate risk factors (2+): obesity (BMI 30+)    Pre-eclampsia risk is NOT at high risk of preeclampsia. Low-dose aspirin prophylaxis is NOT recommended for prevention of preeclampsia.    Started treatment for UTI two months ago.  Started to have some itching around urethral oriface, so stopped.  Never had bladder symptoms.    BP 96/60 (Patient Site: Right Arm, Patient Position: Sitting, Cuff Size: Adult Regular)   Pulse 100   Wt 185 lb (83.9 kg)   LMP 06/10/2020   BMI 35.83 kg/m     GENERAL: alert, not distressed  CHEST: clear, no rales, rhonchi, or wheezes  CARDIAC: regular without murmur, gallop, or rub  ABDOMEN: soft, non tender, non distended, normal bowel sounds     Recent Results (from the past 240 hour(s))   Urinalysis, OB Screen    Collection Time: 09/21/20 11:07 AM   Result Value Ref Range    Glucose, UA Negative Negative    Ketones, UA Trace (!) Negative    Protein, UA Trace (!) Negative mg/dL   Culture, Urine    Collection Time: 09/21/20 11:50 AM    Specimen: Urine   Result Value Ref Range    Culture No Growth        Assessment and Plan:  1. Supervision of normal pregnancy in second trimester  cfDNA ordered.    Return in 4 weeks (on 10/19/2020) for prenatal care.

## 2021-06-12 NOTE — PATIENT INSTRUCTIONS - HE
"  Patient Education   HEALTHY PREGNANCY CARE: 18-22 WEEKS PREGNANT    Your baby is continuing to develop quickly. At this stage, babies can now suck their thumbs,  firmly with their hands, and are beginning to hear.    Sometime between 18 and 22 weeks, you will start to feel your baby move. At first, these small fetal movements feel like fluttering or \"butterflies.\" Some women say that they feel like gas bubbles. As the baby grows, these movements will become stronger and be able to be felt through your abdomen.     Nutrition: During this time, you may find that your nausea and fatigue are gone. Overall, you may feel better and have more energy than you did in your first trimester. Be sure you are getting enough calcium and iron in your diet. Your prenatal vitamins cannot supply all of the nutrients you need, so continue to eat 3-4 servings of dairy foods and 2-3 servings of meat/fish/poultry/nuts every day. Foods high in iron include: red meats, eggs, dark green vegetables, dark yellow vegetables, nuts, kidney beans and chickpeas. Some cereals are fortified with iron, so look at the food labels for 100% of the daily requirement for iron.     [place] for childbirth and parenting classes, including an infant CPR class. Breastfeeding classes are recommended too.    Plan for the gestational diabetes screening between weeks 24-28. You can eat normally before that visit; we would suggest making sure you have protein foods, but not a lot of carbohydrates or sugary foods.    Your blood type was determined at your first OB appointment. If you are Rh negative, you should discuss the need for a Rhogam shot with your midwife or physician.     If you had a  birth in the past, discuss a trial of labor with your midwife or physician. He or she may ask that you obtain your operative report from that  if you are wanting to have a vaginal birth after  () this time.     Think about the support you " have, and what help you can plan on from family and friends, after your baby is born. Many mothers and babies are ready to go home from the hospital within a few days. Your clinic staff is available to assist you and the [facility] staff is available to you after hours. Start preparing your other children for changes they'll experience with the new baby. Explore day care options.    You may find that you have new discomforts now, such as sleep problems or leg cramps. To access information that can help you ease these discomforts, you can refer to the Starting Out Right book or find it online at [email or web address] or [email or web address]    You can sign up for a weekly parenting e-mail that gives support, tips and advice from health care professionals that starts with pregnancy and continues through the toddler years. To register, go to [email or web address] at any time during your pregnancy.    Watch for the warning signs of premature labor:     Dull, low backache    Contractions of the uterus, menstrual-like cramps    Abdominal cramping with or without diarrhea    More pelvic pressure    Increase or change in vaginal discharge.     Remember that labor doesn't have to hurt. Never hesitate to call your midwife or physician or their staff at [facility] at Phone: [phone] for any one of these warning signs - or if something just doesn't feel right. If it's after clinic hours, physician patients should call the [facility] at [phone]); midwife patients should call their answering service at [phone].

## 2021-06-12 NOTE — PROGRESS NOTES
Subjective:  25 y.o.  at 18w5d  No ctx, lof, bleeding.  No HA or vision changes.  Some discharge and itch  Had bee taking antibiotics.    Skin rash  Weather colder.  Washing more.    Outpatient Medications Prior to Visit   Medication Sig Dispense Refill     calcium, as carbonate, (TUMS) 200 mg calcium (500 mg) chewable tablet Take 1-2 tablets 3-4 times a day, as needed for heartburn or acid reflux. 100 tablet 0     doxylamine (UNISOM) 25 mg tablet Take 1/2 tablet before bed, as needed for nausea. 35 tablet 0     pyridoxine, vitamin B6, (VITAMIN B-6) 25 MG tablet Take 1 tablet by mouth 3 times a day, as needed for nausea. 60 tablet 0     prenatal vitamin gummy (PRENATAL GUMMY) 400 mcg-35 mg -25 mg-5 mg Chew Chew 1 each daily. 100 tablet 3     No facility-administered medications prior to visit.       Social History     Tobacco Use   Smoking Status Never Smoker   Smokeless Tobacco Never Used      Objective:  /56 (Patient Site: Left Arm, Patient Position: Sitting, Cuff Size: Adult Regular)   Pulse 76   Wt 185 lb (83.9 kg)   LMP 06/10/2020   BMI 35.83 kg/m    GENERAL: alert, not distressed  CHEST: clear, no rales, rhonchi, or wheezes  CARDIAC: regular without murmur, gallop, or rub  ABDOMEN: gravid, soft, non tender, non distended, normal bowel sounds  SKIN: Amorphous, scaly, erythematous patches and plaques. Arms, hands   cervix normal, curdish vaginal discharge with mildly inflamed vaginal mucosa.    Recent Results (from the past 24 hour(s))   Urinalysis, OB Screen (ketones, glucose, protein only)   Result Value Ref Range    Glucose, UA Negative Negative    Ketones, UA Negative Negative    Protein, UA Negative Negative mg/dL   Wet Prep, Vaginal    Specimen: Genital   Result Value Ref Range    Yeast Result Yeast Seen (!) No yeast seen    Trichomonas No Trichomonas seen No Trichomonas seen    Clue Cells, Wet Prep No Clue cells seen No Clue cells seen      Assessment and Plan:   1. Encounter for  supervision of normal pregnancy in second trimester  - Urinalysis, OB Screen (ketones, glucose, protein only)  - US OB > = 14 Weeks; Future    2. Vaginal discharge during pregnancy in second trimester  - Wet Prep, Vaginal  - Chlamydia trachomatis & Neisseria gonorrhoeae, Amplified Detection    3. Eczema, unspecified type  - triamcinolone (KENALOG) 0.1 % cream; Apply thin layer to affected areas twice daily as needed  Dispense: 45 g; Refill: 0    4. Need for influenza vaccination  - Influenza, Seasonal Quad, PF =/> 6months    5. Yeast infection of the vagina  - fluconazole (DIFLUCAN) 150 MG tablet; Take 1 tablet (150 mg total) by mouth once for 1 dose.  Dispense: 1 tablet; Refill: 0

## 2021-06-12 NOTE — PROGRESS NOTES
Chief Complaint:  Chief Complaint   Patient presents with     Pregnancy Confirmation     HPI:   Davey Rooney is a 21 y.o. female is here for pregnancy intake.  She is .  Patient's last menstrual period was 2017.  Positive home pregnancy test in early July.  Nausea and some vomiting for 2 months.    Past medical history: reviewed and updated.  Past Medical History:   Diagnosis Date     Asymptomatic bacteriuria during pregnancy 10/27/2015     Chorioamnionitis, delivered, current hospitalization 2016     Miscarriage 2014     Miscarriage 2015     Vacuum extractor delivery, delivered 2016     No past surgical history on file.    Current Outpatient Prescriptions:      prenatal vitamin iron-folic acid 27mg-0.8mg (PRENATAL S) 27 mg iron- 800 mcg Tab tablet, Take 1 tablet by mouth daily., Disp: 100 tablet, Rfl: 3     Q- mg tablet, TK 1 TO 2 TS PO Q 4 TO 6 H PRN, Disp: , Rfl: 4     pyridoxine, vitamin B6, (VITAMIN B-6) 25 MG tablet, Take 1 tablet by mouth 3 times a day, as needed for nausea., Disp: 60 tablet, Rfl: 0    Social:  Social History   Substance Use Topics     Smoking status: Never Smoker     Smokeless tobacco: None     Alcohol use No       OBJECTIVE:  No Known Allergies  Vitals:    17 1132   BP: 114/62   Pulse: 60   Resp: 20   SpO2: 97%     Body mass index is 34.18 kg/(m^2).    Vital signs stable as recorded above  General: Patient is alert and oriented x 3, in no apparent distress  Fetal Exam: Fundal height was not palpable, fetal heart tones are heard at 140 bpm.    Results:  Results for orders placed or performed in visit on 17   Culture, Urine   Result Value Ref Range    Culture >100,000 col/ml Escherichia coli (!)        Susceptibility    Escherichia coli - ANUPAMA     Amoxicillin + Clavulanate <=4/2 Sensitive      Ampicillin <=4 Sensitive      Cefazolin <=1 Sensitive      Cefepime <=1 Sensitive      Ceftriaxone <=1 Sensitive      Ciprofloxacin <=0.5 Sensitive       Gentamicin <=2 Sensitive      Levofloxacin <=1 Sensitive      Meropenem <=0.25 Sensitive      Nitrofurantoin <=16 Sensitive      Tetracycline <=2 Sensitive      Tobramycin <=2 Sensitive      Trimethoprim + Sulfamethoxazole >2/38 Resistant    Pregnancy (Beta-hCG, Qual), Urine   Result Value Ref Range    Pregnancy Test, Urine Positive (!) Negative   ABO/RH Typing (OP order)   Result Value Ref Range    HML ABO/Rh Typing O POS     HML ABO/Rh Repeat Typing O POS    Hepatitis B Surface antigen (HBsAG)   Result Value Ref Range    Hepatitis B Surface Ag Negative Negative   HIV Antigen/Antibody Screening Cascade   Result Value Ref Range    HIV Antigen / Antibody Negative Negative   HML Antibody Screen   Result Value Ref Range    HML Antibody Screen Negative Negative   RPR   Result Value Ref Range    Syphilis Screen Cascade Non-Reactive Non-Reactive   Lead, Blood   Result Value Ref Range    Lead  <5.0 ug/dL    Collection Method Venous     Lead Retest No    Rubella Immune Status (IgG)   Result Value Ref Range    Rubella IgG Equivocal (!) Immune   Drugs of Abuse 1,Urine   Result Value Ref Range    Amphetamines Screen Negative Screen Negative    Benzodiazepines Screen Negative Screen Negative    Opiates Screen Negative Screen Negative    Phencyclidine Screen Negative Screen Negative    THC Screen Negative Screen Negative    Barbiturates Screen Negative Screen Negative    Cocaine Metabolite Screen Negative Screen Negative    Oxycodone Screen Negative Screen Negative    Creatinine, Urine 173.5 mg/dL   HM1 (CBC with Diff)   Result Value Ref Range    WBC 7.1 4.0 - 11.0 thou/uL    RBC 4.40 3.80 - 5.40 mill/uL    Hemoglobin 12.5 12.0 - 16.0 g/dL    Hematocrit 37.0 35.0 - 47.0 %    MCV 84 80 - 100 fL    MCH 28.4 27.0 - 34.0 pg    MCHC 33.8 32.0 - 36.0 g/dL    RDW 12.6 11.0 - 14.5 %    Platelets 218 140 - 440 thou/uL    MPV 9.2 8.5 - 12.5 fL    Neutrophils % 67 50 - 70 %    Lymphocytes % 26 20 - 40 %    Monocytes % 6 2 - 10 %     Eosinophils % 1 0 - 6 %    Basophils % 0 0 - 2 %    Neutrophils Absolute 4.7 2.0 - 7.7 thou/uL    Lymphocytes Absolute 1.8 0.8 - 4.4 thou/uL    Monocytes Absolute 0.4 0.0 - 0.9 thou/uL    Eosinophils Absolute 0.1 0.0 - 0.4 thou/uL    Basophils Absolute 0.0 0.0 - 0.2 thou/uL   Urinalysis, OB Screen   Result Value Ref Range    Glucose, UA Negative Negative    Ketones, UA Negative Negative    Protein, UA Negative Negative mg/dL     Other screening pregnancy lab work is ordered and pending.    Patient scored a 5/30 on [place]  Depression Screen.    Assessment and Plan:  1. Pregnancy Intake Appointment.  Davey Rooney is 21 y.o. and .  Patient's last menstrual period was 2017.  Estimated Date of Delivery: 3/13/18  She will be seeing Dr. Bass for OB care.  Screening pregnancy lab work was drawn.  Prenatal vitamins prescribed.  Problem list and current medications reviewed and updated.  Dating ultrasound ordered.  Prenatal info packet given.    Follow up in 3 weeks.  Please see OB Episode for complete details.  Visit was approximately 35 minutes, greater than 50% of time spent in face-to-face counseling and coordination of care.    This dictation uses voice recognition software, which may contain typographical errors.

## 2021-06-13 NOTE — TELEPHONE ENCOUNTER
Patient reports she is 22 weeks pregnant, third baby, has abdominal cramps all day, rates pain 6-7/10. She just went to use the bathroom and saw blood. Plans to deliver at [facility]. Advised per protocol to be seen in the ED/L&D. Patient verbalized understanding.     FNA called the charge nurse at [facility]. The charge nurse states she is going to call the patient back directly to speak with her to get more details.    Yue Michel RN/[facility]      COVID 19 Nurse Triage Plan/Patient Instructions    Please be aware that novel coronavirus (COVID-19) may be circulating in the community. If you develop symptoms such as fever, cough, or SOB or if you have concerns about the presence of another infection including coronavirus (COVID-19), please contact your health care provider or visit [email or web address].     Disposition/Instructions    Go to ED/L&D    Thank you for taking steps to prevent the spread of this virus.  o Limit your contact with others.  o Wear a simple mask to cover your cough.  o Wash your hands well and often.    Resources    [facility]: About COVID-19: [email or web address]    CDC: What to Do If You're Sick: www.cdc.gov/coronavirus/2019-ncov/about/steps-when-sick.html    CDC: Ending Home Isolation: www.cdc.gov/coronavirus/2019-ncov/hcp/disposition-in-home-patients.html     CDC: Caring for Someone: www.cdc.gov/coronavirus/2019-ncov/if-you-are-sick/care-for-someone.html     [facility]: Interim Guidance for Hospital Discharge to Home: www.health.[facility].mn.us/diseases/coronavirus/hcp/hospdischarge.pdf    [facility] clinical trials (COVID-19 research studies): clinicalaffairs.[facility]-clinical-trials     Below are the COVID-19 hotlines at the [facility] ([facility]). Interpreters are available.   o For health questions: Call [phone] or [phone] (7 a.m. to 7 p.m.)  o For questions about schools and childcare: Call  "[phone] or [phone] (7 a.m. to 7 p.m.)       Reason for Disposition    MODERATE-SEVERE abdominal pain (e.g., interferes with normal activities, awakens from sleep)    Vaginal bleeding or spotting    Additional Information    Negative: Followed an abdomen (stomach) injury    Negative: [1] Having contractions or other symptoms of labor (such as vaginal pressure) AND [2] >= 37 weeks pregnant (i.e., term pregnancy)    Negative: [1] Having contractions or other symptoms of labor (such as vaginal pressure) AND [2] < 37 weeks pregnant (i.e., )    Negative: [1] Abdominal pain AND [2] pregnant < 20 weeks    Negative: [1] Vomiting AND [2] contains red blood or black (\"coffee ground\") material  (Exception: few red streaks in vomit that only happened once)    Negative: Passed out (i.e., lost consciousness, collapsed and was not responding)    Negative: Shock suspected (e.g., cold/pale/clammy skin, too weak to stand, low BP, rapid pulse)    Negative: Difficult to awaken or acting confused (e.g., disoriented, slurred speech)    Negative: [1] SEVERE abdominal pain (e.g., excruciating) AND [2] constant AND [3] present > 1 hour    Negative: SEVERE vaginal bleeding (e.g., continuous red blood from vagina, or large blood clots)    Negative: Sounds like a life-threatening emergency to the triager    Protocols used: PREGNANCY - ABDOMINAL PAIN GREATER THAN 20 WEEKS EGA-A-GAGANDEEP      "

## 2021-06-13 NOTE — PROGRESS NOTES
Subjective:  25 y.o.  at 26w5d  No ctx, lof, bleeding, or dc.  No HA or vision changes.    Outpatient Medications Prior to Visit   Medication Sig Dispense Refill     triamcinolone (KENALOG) 0.1 % cream Apply thin layer to affected areas twice daily as needed 45 g 0     prenatal vitamin gummy (PRENATAL GUMMY) 400 mcg-35 mg -25 mg-5 mg Chew Chew 1 each daily. 100 tablet 3     No facility-administered medications prior to visit.       Social History     Tobacco Use   Smoking Status Never Smoker   Smokeless Tobacco Never Used      Objective:  BP 94/50 (Patient Site: Right Arm, Patient Position: Sitting, Cuff Size: Adult Regular)   Pulse 92   Wt 189 lb (85.7 kg)   LMP 06/10/2020   BMI 36.91 kg/m    GENERAL: alert, not distressed  CHEST: clear, no rales, rhonchi, or wheezes  CARDIAC: regular without murmur, gallop, or rub  ABDOMEN: gravid, soft, non tender, non distended, normal bowel sounds    Recent Results (from the past 24 hour(s))   Urinalysis, OB Screen   Result Value Ref Range    Glucose, UA Negative Negative    Ketones, UA Negative Negative    Protein, UA Negative Negative mg/dL   Glucose,Gestational Challenge (1 Hour)   Result Value Ref Range    Glucose, Gestational Challenge 1hr 77 70 - 139 mg/dL    Patient Fasting > 8hrs? No    Hemoglobin   Result Value Ref Range    Hemoglobin 11.6 (L) 12.0 - 16.0 g/dL      Assessment and Plan:   1. Supervision of normal pregnancy in second trimester  She is feeling okay.  No concerns at the present.  Glucose challenge test today.  Return in 4 weeks for further prenatal care.

## 2021-06-13 NOTE — TELEPHONE ENCOUNTER
----- Message from Jv Bass MD sent at 11/13/2020  2:15 PM CST -----  Call:  Urine culture did grow bacteria.  The medicine should be helping.

## 2021-06-13 NOTE — PROGRESS NOTES
No ctx, lof, bleeding, or dc.  No HA or vision changes.  Order ultrasound.  Pt declines pap, flu shot.

## 2021-06-13 NOTE — PROGRESS NOTES
"Davey Rooney is a 25 y.o. female who is being evaluated via a billable telephone visit.      The patient has been notified of following:     \"This telephone visit will be conducted via a call between you and your physician/provider. We have found that certain health care needs can be provided without the need for a physical exam.  This service lets us provide the care you need with a short phone conversation.  If a prescription is necessary we can send it directly to your pharmacy.  If lab work is needed we can place an order for that and you can then stop by our lab to have the test done at a later time.    Telephone visits are billed at different rates depending on your insurance coverage. During this emergency period, for some insurers they may be billed the same as an in-person visit.  Please reach out to your insurance provider with any questions.    If during the course of the call the physician/provider feels a telephone visit is not appropriate, you will not be charged for this service.\"    Patient has given verbal consent to a Telephone visit? Yes    What phone number would you like to be contacted at? [phone]     Patient would like to receive their AVS by AVS Preference: Yifan.    Additional provider notes:   Met with patient for telephone follow-up.  Presented to labor and delivery with concerns of vaginal bleeding.  She was 22 weeks at the time.  Found to have E. coli UTI.  Pansensitive.  Treated with cephalexin.  She had this previously in pregnancy.    She had wiped and found a \"grape sized,\" clot on the day she presented to the hospital.  She did have some back pain.  She did not have CVA or flank pain.  She did not have cramping consistent with uterine tenderness.  She did not have increase in urinary frequency or any dysuria.    Since starting cephalexin the symptoms have abated.  She is feeling her baby move normally.    No headaches or leg swelling.    Continues to have no fever, CVA " tenderness, flank tenderness.  Back discomfort has improved.    She denies discharge or any further bleeding.    Assessment/Plan:  1. Urinary tract infection in mother during second trimester of pregnancy  2. Acute cystitis with hematuria  We discussed strategies that may help reduce incidence of acute UTI and pregnancies.    3. Yeast infection of the vagina  Patient concerned that she is likely to get a yeast infection after taking the course of antibiotics.  She has had exact scenario previously this pregnancy.  We will send a treatment for yeast to follow her antibiotic prescription.  - fluconazole (DIFLUCAN) 150 MG tablet; Take 1 tablet (150 mg total) by mouth once for 1 dose.  Dispense: 1 tablet; Refill: 0    Follow-up for glucose test in 4 weeks.    Phone call duration:  8 minutes    Jv Bass MD

## 2021-06-13 NOTE — PROGRESS NOTES
Discussed screening for aneuploidy and neural tube defects. Declines serum screening.    Reviewed intake exam, labs, SD, past medical history, past surgical history, family history, genetic history, and previous obstetrical history.

## 2021-06-13 NOTE — PROGRESS NOTES
RN received report from charge RN about pt triage complaint and plan of care. Pt voids genaro urine, await US report. Pt states small cramping remains. Taking po fluids well.  Karen J Schoenberg

## 2021-06-13 NOTE — PROGRESS NOTES
arrives for cramping that started at 0900 and vaginal bleeding noted by pt when wiped at 1730 and a clot the size of a grape at that time.  Pt had a pad on at arrival no bleeding on pad and none between legs, pt denies LOF, denies hemorrhoids, -155, VS as noted.  Pt denies intercourse or anything in her vagina in the last 24 hours.  Dr. Bass called and notified of note.  Orders for US and call with results.

## 2021-06-14 NOTE — PROGRESS NOTES
Breast lumps come and go?!?  Better if she takes a hot shower.  Worse when she wakes in AM.  No galactorrhea.  No pain.  Not present today.  Peculiar presentation.  Continue to monitor.    No ctx, lof, bleeding, or dc.  No HA or vision changes.  Tdap and Flu vaccines today.

## 2021-06-14 NOTE — PATIENT INSTRUCTIONS - HE
Patient Education   HEALTHY PREGNANCY CARE: 30-34 WEEKS PREGNANT    You have made it to the final months of your pregnancy. By now, your baby is starting to fill out with some fat under his skin, fuzzy hair on his shoulders, and is gaining 4 to 6 ounces per week.    Discuss any travel plans with your midwife or physician.    Review possible changes in sexuality during later pregnancy and discuss these with your midwife or physician, as well as your partner. Alternative love-making positions may be more comfortable.    Discuss labor and delivery issues with your midwife or physician. If you had a  birth in the past, discuss a trial of labor with your midwife or physician. He or she may ask that you sign a consent form, if you wish to have a vaginal birth after  (). Ask your midwife or physician to explain your options for managing pain during your labor and delivery. Sometimes, during the birth process, an episiotomy may need to be cut in the vagina to make the opening bigger or let the baby come out quicker. You may want to discuss the episiotomy and how often it is needed with your midwife or physician.    Plan for your baby's care by selecting a child health care provider (Family physician, Pediatrician, or Pediatric Nurse Practitioner). Practice installing an infant car seat correctly in the car. Ask for car seat information as needed and make sure it is safe and will work in the car your baby will ride in. You will need a car seat to bring your baby home from the hospital. Check the procedure for adding your baby to your health care plan. Review your decision about circumcision and ask for any information you need. As you buy and receive items for your baby, don't put a baby walker on your list. Walkers can be dangerous and can cause serious injury to your child. A safer option is a saucer-type play station, since it doesn't allow baby to travel across the floor.    Discuss your choices and  plans for birth control with your midwife or physician. Women who are breastfeeding can still become pregnant. Use a birth control method if you want to lower your pregnancy risk. Talk to your midwife or physician if you are considering permanent birth control, such as tubal ligation or Essure. You may need to complete a consent form 30 days prior to delivery in order to have this done after you deliver.    Continue to watch for signs and symptoms of preeclampsia:     Sudden swelling of your face, hands, or feet     New vision problems such as blurring, double vision, or flashing lights    A severe headache not relieved with acetaminophen (Tylenol)    Sharp or stabbing pain in your right or middle upper abdomen    Watch for signs and symptoms of premature labor:     Regular contractions. This means having about 6 or more within 1 hour, even after you have had a glass of water and are resting.     A backache that starts and stops regularly.    An increase or change in vaginal discharge, such as heavy, mucus-like, watery, or bloody discharge.     Your water breaks or leaks.    If you have any of the above symptoms or any other concerns, call your provider or their clinic staff at [facility] at Phone: [phone]. If it's after clinic hours, physician patients should call the [facility] at [phone] ([phone]); midwife patients should call their answering service at [phone].    How can you care for yourself at home?   You can refer to the Starting Out Right book or find it online at http://www.[email or web address]/images/stories/maternity/[facility]-Starting-Out-Right.pdf or http://www.[facility].org/images/stories/flipbooks/[facility]-starting-out-right/[facility]-starting-out-right.html#p=8     You can sign up for a weekly parenting e-mail that gives support, tips and advice from health care professionals that starts with pregnancy and continues through the toddler years. To register, go  to [email or web address] at any time during your pregnancy.

## 2021-06-14 NOTE — PROGRESS NOTES
No ctx, lof, bleeding, or dc.  No HA or vision changes.  Wants to wait on flu shot until next visit.

## 2021-06-14 NOTE — PROGRESS NOTES
Subjective:  25 y.o.  at 30w5d  No ctx, lof, bleeding, or dc.  No HA or vision changes.    Outpatient Medications Prior to Visit   Medication Sig Dispense Refill     prenatal vitamin gummy (PRENATAL GUMMY) 400 mcg-35 mg -25 mg-5 mg Chew Chew 1 each daily. 100 tablet 3     triamcinolone (KENALOG) 0.1 % cream Apply thin layer to affected areas twice daily as needed 45 g 0     No facility-administered medications prior to visit.       Social History     Tobacco Use   Smoking Status Never Smoker   Smokeless Tobacco Never Used      Objective:  /65 (Patient Site: Left Arm, Patient Position: Sitting, Cuff Size: Adult Regular)   Pulse 86   Wt 192 lb 2 oz (87.1 kg)   LMP 06/10/2020   BMI 37.52 kg/m    GENERAL: alert, not distressed  CHEST: clear, no rales, rhonchi, or wheezes  CARDIAC: regular without murmur, gallop, or rub  ABDOMEN: gravid, soft, non tender, non distended, normal bowel sounds    Recent Results (from the past 24 hour(s))   Urinalysis, OB Screen (ketones, glucose, protein only)   Result Value Ref Range    Glucose, UA Negative Negative    Ketones, UA Negative Negative    Protein, UA Negative Negative mg/dL      Assessment and Plan:   1. Third trimester pregnancy  Doing well  Follow up in 4 weeks  Sooner if concerns.

## 2021-06-15 NOTE — PATIENT INSTRUCTIONS - HE
Patient Education   HEALTHY PREGNANCY CARE: 34-36 WEEKS PREGNANT    Your baby is gaining about an ounce each day, so healthy nutrition and rest are still very important. Learn about late pregnancy symptoms, such as constipation and backaches. Drinking more fluids and eating more fiber can relieve constipation. The pelvic tilt and a heating pad can ease backaches.    Continue to watch for signs and symptoms of preeclampsia:     Sudden swelling of your face, hands, or feet     New vision problems such as blurring, double vision, or flashing lights    A severe headache not relieved with acetaminophen (Tylenol)    Sharp or stabbing pain in your right or middle upper abdomen    You're almost done with your pregnancy but it's still too soon to have your baby. The goal is to have your baby after 37 weeks, so watch for signs and symptoms of premature labor:     Regular contractions. This means having about 6 or more within 1 hour, even after you have had a glass of water and are resting.     A backache that starts and stops regularly.    An increase or change in vaginal discharge, such as heavy, mucus-like, watery, or bloody discharge.     Your water breaks or leaks.    You will be tested for group B streptococcus (GBS), a type of bacteria found in 10-30% of pregnant women. A woman with GBS can pass it to her baby during delivery. Most babies who get GBS from their mothers do not have any problems, but some babies get very ill and need to be hospitalized for antibiotic treatment. Treating you with antibiotics during labor and delivery helps to prevent infection in your baby.    Review information on postpartum care that you will need after the baby is born.  Discuss your choices and plans for birth control with your midwife or physician.     Postpartum Care  During the days and weeks after the delivery of your baby (postpartum period), your body will change as it returns to its nonpregnant condition. As with pregnancy  "changes, postpartum changes are different for every woman.    Physical changes after childbirth  The changes in your body may include:    Contractions called afterpains shrink the uterus for several days after childbirth. Shrinking of the uterus to its prepregnancy size may take 6 to 8 weeks.    Sore muscles (especially in the arms, neck, or jaw) are common after childbirth. This is because of the hard work of labor and pushing your baby out. The soreness should go away in a few days.    Bleeding and vaginal discharge (lochia) may last for 2 to 8 weeks, and can come and go for about 2 months.    Vaginal soreness, including pain, discomfort, and numbness, is common after vaginal birth. Soreness may be worse if you had a perineal tear or episiotomy.    If you had a  birth (), you may have pain in your lower abdomen and may need pain medicine for 1 to 2 weeks.    Breast engorgement is common around the 3rd or 4th day after delivery, when the breasts begin to fill with milk. This can cause discomfort and swelling. If you are breast feeding, the best treatment is to feed your baby often or pump the milk out. You can also use warm compresses. If you are not breast feeding, placing ice packs on your breasts, taking a hot shower, or using warm compresses may relieve the discomfort.    Be aware of postpartum depression    \"Baby blues\" are common for the first 1 to 2 weeks after birth. You may cry or feel sad or irritable for no reason.     For some women, these feelings last longer and are more intense. This is called postpartum depression.     If your symptoms last for more than a few weeks or you feel very depressed, ask your midwife or physician for help.     Postpartum depression can be treated. Support groups and counseling can help, but sometimes medication is needed.     Discuss follow-up appointments with your midwife or physician, as well. He or she will usually want to see you 6 weeks after the " birth of your baby, sooner if you are having problems.    If you have questions about any symptoms you are experiencing or any other concerns, call your provider or their clinic staff at [facility] at Phone: [phone]. If it's after clinic hours, physician patients should call the [facility] at [phone]); midwife patients should call their answering service at [phone].    How can you care for yourself at home?   You can refer to the Starting Out Right book or find it online at http://www.[facility].org/images/stories/http://www.[email or web address]/images/stories/maternity/[facility]-[email or web address]-Right.pdf or http://www.[facility].org/images/stories/flipbooks/[facility]-starting-out-right/[facility]-starting-out-right.html#p=8     You can sign up for a weekly parenting e-mail that gives support, tips and advice from health care professionals that starts with pregnancy and continues through the toddler years. To register, go to [email or web address] at any time during your pregnancy.    Making Early Breastfeeding or Chestfeeding Work: What's Important?  Breastfeeding/chestfeeding is important!     It helps keep babies healthy.    Parents who breastfeed/chestfeed have lower risks of breast and ovarian cancer.    It's convenient: the milk is always ready and warm, and there is nothing to mix or prepare for feeding.    Formula is harder for your baby to digest.    It helps you bond with your baby and protects against postpartum depression.  Lots of early skin-to-skin contact with your baby    Place your naked baby with baby's belly against your bare chest. Cover baby's back with a blanket    Start skin-to-skin right after birth, as soon as you are ready    Skin-to-skin:  ? Helps keep baby warm  ? Improves baby's oxygen and blood sugar levels  ? Helps your uterus contract and bleed less  ? Helps baby feel calm and comforted  ? Helps you feel close to baby  ? Helps get  "breastfeeding started. Being close makes latching on easier, and baby may move over to the nipple and latch without help  ? Baby breastfeeds better and longer when skin-to-skin  Placing baby well for good attachment to the breast or chest    Hold your baby close with baby's tummy touching your tummy.    Wait for baby to open mouth wide, then bring baby onto the breast/chest.    Baby should take a big mouthful of breast/chest, not just the nipple. This helps baby get more milk, and the suckling should feel comfortable.    When baby is latched well to the breast/chest, nipples aren't cracked or painful.  Keeping baby near (called \"rooming-in\" at the hospital)    Baby sleeps better and cries less when birth parent is near. Your room is quiet.    We will place your baby safely on their back in their bassinette. This lets you practice safe sleep for your baby while keeping them at your bedside.    Baby feeds more often, which means your milk supply increases faster, and your baby loses less weight.    Parents have an easier time getting to know and bonding with baby.    Parents feel much more confident about baby care and breastfeeding/chestfeeding.    Maternity staff can help at any time.  Feeding on cue    Feeding on cue simply means feeding whenever your baby shows signs of hunger    Crying is a late hunger sign. Feed baby whenever baby wants for as long as baby wants.    Feeding signs: mouth movements, sticking the tongue out, rooting (baby turns toward chest and may open mouth), hand-to-mouth movements    Advantages of feeding on cue:  ? Frequent breastfeeding/chestfeeding in the first weeks after birth gives you a good milk supply for months to come.  ? Babies settle into a relaxing feed more quickly. Babies enjoy feedings more when they don't have to cry to be fed.  ? Feeding is comfort as well as nutrition. Newborns love constant closeness and feeding and can't be held \"too much\" or \"spoiled.\"  ? Newborns need " "small frequent feedings in the first days of life. Just one to three teaspoons fill a new baby's stomach.  ? Responding to feeding cues helps babies gain weight.  Feeding only human milk in the first six months    Colostrum is the first milk that baby gets at birth. The amount of colostrum matches the baby's stomach, so it will not be overfilled.    The small volumes ready at birth are also easier for baby to handle.    All babies lose weight in the first few days. This is simply \"water weight.\"    Introducing food or fluids other than human milk too early can cause problems for breastfeeding/chestfeeding and for your baby's health.    Feeding only human milk maximizes the protection against disease and infections.    Your body knows how much milk to make by how often your baby feeds. If you give your baby formula, your body may not know how much milk to make.    For informational purposes only. Not to replace the advice of your health care provider. Adapted with permission from \"Getting Started with Infant Care and Breastfeeding,\" by ANYA Moses, LIBRADO, Leeann Alvarez, RN, IBCLC, Antonia Osuna MD, LIBRADO, and Janet Verma MD, IBCLC. [place] [employer], 2017. Clinically reviewed by [facility]. [employer] 717582 - 05/19.        [facility] Breastfeeding Resources       Research shows that human milk offers the best  nutrition and protection for babies. So at [facility],  we care for families and babies in a way that  promotes, teaches and supports lactation. We  support all breastfeeding, chestfeeding and human  milk feeding families, as well as families who can t  breastfeed / chestfeed or who choose not to do so.  A mother or caregiver s own milk is best for a baby,  but if you are unable to breastfeed / chestfeed, we  may suggest pasteurized donor human milk for your  baby while in the hospital.    Lactation support    The following [facility]-affiliated locations offer  individual " outpatient lactation consults. Some  locations offer phone or group lactation consults  with certified lactation consultants. Call to confirm  services and for information and appointments. You  may wish to call your insurance company first to see  if they will cover the cost of a consult.    [facility]: [phone]    [facility]: [phone]    [facility]: [phone]  Offers [place] First Days program, which includes  group lactation visits and one free information session  about delivering your baby at a designated [facility] hospital and the importance and benefits  of breastfeeding and human milk. These group visits  also help patients with feeding concerns and offer  information about other postpartum topics.                For informational purposes only. Not to replace the advice of your health care  provider. Copyright   2005 [facility]. All rights reserved. [employer] 921411 - REV .   [facility] ([place]):  [phone]    [facility]):  [phone] or [phone]    [facility]):  [phone]    [facility]  ([place]): [phone]    [facility] ([place]):  [phone] or [phone]  Includes follow-up visits for caregivers of babies  discharged from the  intensive care unit  (NICU) at [facility].    [facility]):  [phone]    [facility] ([facility],  [facility], primary care clinics):  [phone]  Also offers weight checks, feeding discussions and support with a lactation consultant as a part of free, weekly support group.    [facility]: [phone]  Also offers a free weekly support group.                                                                                                                                                                                                       (continued)   You may also call [facility] at [phone]  for information about [place] and [facility]  locations that offer lactation support. For information  about breastfeeding / chestfeeding and childbirth  classes in the [facility], go to [facility] at www.[employer]. For [facility], go to [email or web address] and click on  Classes and Events at the bottom of the page. Or, call  [phone].    Supplies    You can get breast pumps from the Birthplace nurses  at [facility] or [facility]  nurses at [facility]. Call your health  insurance to see if they will cover the cost of the  pump. Tell your nurse you d like a pump. They will  help you fill out the right paperwork. The pump will  be ready for you when you leave the hospital.    If you decide to get a pump after you leave the  hospital, you can get one from our partners at  [facility]. [facility] carries a range of  feeding supplies and pumps. Please call your health  insurance to see if they will cover the cost of the  pump. Then call [facility]  to find out what supplies and pumps are available.  Some stores may deliver the pump to your home.    [facility]:  [email or web address] Other lactation services    [facility]: [phone] (24 hours a day)  [email or web address]  Offers support for breastfeeding / chestfeeding and  human milk feeding families. Call to find a group in  your area.    National Women [facility] Information [facility]:  [phone]  www.womenshealth.gov/breastfeeding  Offers a breastfeeding / chestfeeding information  line in English and Latvian.    WIC (Women, Infants and Children) Program:  [phone]  Offers breastfeeding / chestfeeding counseling. Call  to find an office near  you.    Milk banking    [facility]  [email or web address]  [phone]  Consider freezing your extra collected milk to donate  to babies in need. Email or call for information.                           If you are deaf or hard of hearing, please let us know. We provide many free services including  sign language interpreters, oral interpreters, TTYs, telephone amplifiers, note takers and written materials.

## 2021-06-15 NOTE — TELEPHONE ENCOUNTER
Called and spoke with Davey Rooney , Message was given, Davey Rooney  understood, no further questions.

## 2021-06-15 NOTE — PROGRESS NOTES
Subjective:  25 y.o.  at 37w6d  Good FM  Has a headache and feels chills.  No fever  Some right flank/CVA pain  No urinary symptoms.  Has not taken any meds    Gets COVID tests at work.  Had one yesterday, by report, negative.  No cough, loss of taste or smell.  Dyspnea not unchanged from pregnancy.    Outpatient Medications Prior to Visit   Medication Sig Dispense Refill     prenatal vitamin gummy (PRENATAL GUMMY) 400 mcg-35 mg -25 mg-5 mg Chew Chew 1 each daily. 100 tablet 3     No facility-administered medications prior to visit.       Social History     Tobacco Use   Smoking Status Never Smoker   Smokeless Tobacco Never Used      Objective:  /82 (Patient Site: Left Arm, Patient Position: Sitting, Cuff Size: Adult Regular)   Pulse (!) 118   Temp 99.9  F (37.7  C)   Wt 200 lb (90.7 kg)   LMP 06/10/2020   BMI 39.06 kg/m    GENERAL: alert, not distressed  CHEST: clear, no rales, rhonchi, or wheezes  CARDIAC: regular without murmur, gallop, or rub  ABDOMEN: gravid, soft, non tender, non distended, normal bowel sounds  Some right CVA tenderness.    Recent Results (from the past 24 hour(s))   Urinalysis, OB Screen   Result Value Ref Range    Glucose, UA Negative Negative    Ketones, UA Negative Negative    Protein, UA 30 mg/dL (!) Negative mg/dL      Assessment and Plan:   1. Supervision of normal pregnancy in third trimester  Chills and flank/CVA pain on right.  Fetal tachycardia on doptone.  Will direct her to [facility] at [place] and pursue further work up there.  She will head there immediately by private vehicle.  Discussed with charge nurse.    - Urinalysis

## 2021-06-15 NOTE — PROGRESS NOTES
Subjective:  25 y.o.  at 34w5d  No ctx, lof, bleeding.  Has some dc.  Itches.  No HA or vision changes.    Outpatient Medications Prior to Visit   Medication Sig Dispense Refill     prenatal vitamin gummy (PRENATAL GUMMY) 400 mcg-35 mg -25 mg-5 mg Chew Chew 1 each daily. 100 tablet 3     triamcinolone (KENALOG) 0.1 % cream Apply thin layer to affected areas twice daily as needed 45 g 0     No facility-administered medications prior to visit.       Social History     Tobacco Use   Smoking Status Never Smoker   Smokeless Tobacco Never Used      Objective:  /64 (Patient Site: Left Arm, Patient Position: Sitting, Cuff Size: Adult Regular)   Pulse 72   Wt 194 lb (88 kg)   LMP 06/10/2020   BMI 37.89 kg/m    GENERAL: alert, not distressed  CHEST: clear, no rales, rhonchi, or wheezes  CARDIAC: regular without murmur, gallop, or rub  ABDOMEN: gravid, soft, non tender, non distended, normal bowel sounds    Recent Results (from the past 24 hour(s))   Urinalysis, OB Screen   Result Value Ref Range    Glucose, UA Negative Negative    Ketones, UA Negative Negative    Protein, UA 30 mg/dL (!) Negative mg/dL   Wet Prep, Vaginal    Specimen: Genital   Result Value Ref Range    Yeast Result Yeast Seen (!) No yeast seen    Trichomonas No Trichomonas seen No Trichomonas seen    Clue Cells, Wet Prep No Clue cells seen No Clue cells seen      Assessment and Plan:   1. Supervision of normal pregnancy in third trimester  Doing well no concerns.  Routine follow-up in 1 to 2 weeks.  - Urinalysis, OB Screen  - Wet Prep, Vaginal    2. Vaginal yeast infection  We will treat since mildly symptomatic.  Prescription as below.  - miconazole (MICONAZOLE 7) 2 % vaginal cream; Insert 1 applicator into the vagina at bedtime for 7 doses.  Dispense: 45 g; Refill: 0

## 2021-06-15 NOTE — PATIENT INSTRUCTIONS - HE
Patient Education   HEALTHY PREGNANCY CARE: 34-36 WEEKS PREGNANT    Your baby is gaining about an ounce each day, so healthy nutrition and rest are still very important. Learn about late pregnancy symptoms, such as constipation and backaches. Drinking more fluids and eating more fiber can relieve constipation. The pelvic tilt and a heating pad can ease backaches.    Continue to watch for signs and symptoms of preeclampsia:     Sudden swelling of your face, hands, or feet     New vision problems such as blurring, double vision, or flashing lights    A severe headache not relieved with acetaminophen (Tylenol)    Sharp or stabbing pain in your right or middle upper abdomen    You're almost done with your pregnancy but it's still too soon to have your baby. The goal is to have your baby after 37 weeks, so watch for signs and symptoms of premature labor:     Regular contractions. This means having about 6 or more within 1 hour, even after you have had a glass of water and are resting.     A backache that starts and stops regularly.    An increase or change in vaginal discharge, such as heavy, mucus-like, watery, or bloody discharge.     Your water breaks or leaks.    You will be tested for group B streptococcus (GBS), a type of bacteria found in 10-30% of pregnant women. A woman with GBS can pass it to her baby during delivery. Most babies who get GBS from their mothers do not have any problems, but some babies get very ill and need to be hospitalized for antibiotic treatment. Treating you with antibiotics during labor and delivery helps to prevent infection in your baby.    Review information on postpartum care that you will need after the baby is born.  Discuss your choices and plans for birth control with your midwife or physician.     Postpartum Care  During the days and weeks after the delivery of your baby (postpartum period), your body will change as it returns to its nonpregnant condition. As with pregnancy  "changes, postpartum changes are different for every woman.    Physical changes after childbirth  The changes in your body may include:    Contractions called afterpains shrink the uterus for several days after childbirth. Shrinking of the uterus to its prepregnancy size may take 6 to 8 weeks.    Sore muscles (especially in the arms, neck, or jaw) are common after childbirth. This is because of the hard work of labor and pushing your baby out. The soreness should go away in a few days.    Bleeding and vaginal discharge (lochia) may last for 2 to 8 weeks, and can come and go for about 2 months.    Vaginal soreness, including pain, discomfort, and numbness, is common after vaginal birth. Soreness may be worse if you had a perineal tear or episiotomy.    If you had a  birth (), you may have pain in your lower abdomen and may need pain medicine for 1 to 2 weeks.    Breast engorgement is common around the 3rd or 4th day after delivery, when the breasts begin to fill with milk. This can cause discomfort and swelling. If you are breast feeding, the best treatment is to feed your baby often or pump the milk out. You can also use warm compresses. If you are not breast feeding, placing ice packs on your breasts, taking a hot shower, or using warm compresses may relieve the discomfort.    Be aware of postpartum depression    \"Baby blues\" are common for the first 1 to 2 weeks after birth. You may cry or feel sad or irritable for no reason.     For some women, these feelings last longer and are more intense. This is called postpartum depression.     If your symptoms last for more than a few weeks or you feel very depressed, ask your midwife or physician for help.     Postpartum depression can be treated. Support groups and counseling can help, but sometimes medication is needed.     Discuss follow-up appointments with your midwife or physician, as well. He or she will usually want to see you 6 weeks after the " birth of your baby, sooner if you are having problems.    If you have questions about any symptoms you are experiencing or any other concerns, call your provider or their clinic staff at [facility] at Phone: [phone]. If it's after clinic hours, physician patients should call the [facility] at [phone] ([phone]); midwife patients should call their answering service at [phone].    How can you care for yourself at home?   You can refer to the Starting Out Right book or find it online at [email or web address]/images/[email or web address]/http://[email or web address]/images/[email or web address]/[email or web address]-[email or web address]-Right.pdf or [email or web address]/images/[email or web address]/flipbooks/[facility]-starting-out-right/[facility]-starting-out-right.html#p=8     You can sign up for a weekly parenting e-mail that gives support, tips and advice from health care professionals that starts with pregnancy and continues through the toddler years. To register, go to [email or web address] at any time during your pregnancy.    Making Early Breastfeeding or Chestfeeding Work: What's Important?  Breastfeeding/chestfeeding is important!     It helps keep babies healthy.    Parents who breastfeed/chestfeed have lower risks of breast and ovarian cancer.    It's convenient: the milk is always ready and warm, and there is nothing to mix or prepare for feeding.    Formula is harder for your baby to digest.    It helps you bond with your baby and protects against postpartum depression.  Lots of early skin-to-skin contact with your baby    Place your naked baby with baby's belly against your bare chest. Cover baby's back with a blanket    Start skin-to-skin right after birth, as soon as you are ready    Skin-to-skin:  ? Helps keep baby warm  ? Improves baby's oxygen and blood sugar levels  ? Helps your uterus contract and bleed less  ? Helps baby feel calm and comforted  ? Helps you feel close to baby  ? Helps get  "breastfeeding started. Being close makes latching on easier, and baby may move over to the nipple and latch without help  ? Baby breastfeeds better and longer when skin-to-skin  Placing baby well for good attachment to the breast or chest    Hold your baby close with baby's tummy touching your tummy.    Wait for baby to open mouth wide, then bring baby onto the breast/chest.    Baby should take a big mouthful of breast/chest, not just the nipple. This helps baby get more milk, and the suckling should feel comfortable.    When baby is latched well to the breast/chest, nipples aren't cracked or painful.  Keeping baby near (called \"rooming-in\" at the hospital)    Baby sleeps better and cries less when birth parent is near. Your room is quiet.    We will place your baby safely on their back in their bassinette. This lets you practice safe sleep for your baby while keeping them at your bedside.    Baby feeds more often, which means your milk supply increases faster, and your baby loses less weight.    Parents have an easier time getting to know and bonding with baby.    Parents feel much more confident about baby care and breastfeeding/chestfeeding.    Maternity staff can help at any time.  Feeding on cue    Feeding on cue simply means feeding whenever your baby shows signs of hunger    Crying is a late hunger sign. Feed baby whenever baby wants for as long as baby wants.    Feeding signs: mouth movements, sticking the tongue out, rooting (baby turns toward chest and may open mouth), hand-to-mouth movements    Advantages of feeding on cue:  ? Frequent breastfeeding/chestfeeding in the first weeks after birth gives you a good milk supply for months to come.  ? Babies settle into a relaxing feed more quickly. Babies enjoy feedings more when they don't have to cry to be fed.  ? Feeding is comfort as well as nutrition. Newborns love constant closeness and feeding and can't be held \"too much\" or \"spoiled.\"  ? Newborns need " "small frequent feedings in the first days of life. Just one to three teaspoons fill a new baby's stomach.  ? Responding to feeding cues helps babies gain weight.  Feeding only human milk in the first six months    Colostrum is the first milk that baby gets at birth. The amount of colostrum matches the baby's stomach, so it will not be overfilled.    The small volumes ready at birth are also easier for baby to handle.    All babies lose weight in the first few days. This is simply \"water weight.\"    Introducing food or fluids other than human milk too early can cause problems for breastfeeding/chestfeeding and for your baby's health.    Feeding only human milk maximizes the protection against disease and infections.    Your body knows how much milk to make by how often your baby feeds. If you give your baby formula, your body may not know how much milk to make.    For informational purposes only. Not to replace the advice of your health care provider. Adapted with permission from \"Getting Started with Infant Care and Breastfeeding,\" by ANYA Moses, LIBRADO, Leeann Alvarez, RN, IBCLC, Antonia Osuna MD, LIBRADO, and Janet Verma MD, IBCLC. [place] [employer], 2017. Clinically reviewed by [facility]. [employer] 522428 - 05/19.        [facility] Breastfeeding Resources       Research shows that human milk offers the best  nutrition and protection for babies. So at [facility],  we care for families and babies in a way that  promotes, teaches and supports lactation. We  support all breastfeeding, chestfeeding and human  milk feeding families, as well as families who can t  breastfeed / chestfeed or who choose not to do so.  A mother or caregiver s own milk is best for a baby,  but if you are unable to breastfeed / chestfeed, we  may suggest pasteurized donor human milk for your  baby while in the hospital.    Lactation support    The following [facility]-affiliated locations offer  individual " outpatient lactation consults. Some  locations offer phone or group lactation consults  with certified lactation consultants. Call to confirm  services and for information and appointments. You  may wish to call your insurance company first to see  if they will cover the cost of a consult.    [facility]: [phone]    [facility]: [phone]    [facility]: [phone]  Offers [place] First Days program, which includes  group lactation visits and one free information session  about delivering your baby at a designated [facility] hospital and the importance and benefits  of breastfeeding and human milk. These group visits  also help patients with feeding concerns and offer  information about other postpartum topics.                For informational purposes only. Not to replace the advice of your health care  provider. Copyright   2005 [facility]. All rights reserved. [employer] 364986 - REV .   [facility] ([place]):  [phone]    [facility]):  [phone] or [phone]    [facility]):  [phone]    [facility]  ([place]): [phone]    [facility] ([place]):  [phone] or [phone]  Includes follow-up visits for caregivers of babies  discharged from the  intensive care unit  (NICU) at [facility].    [facility]):  [phone]    [facility] ([facility],  [facility], primary care clinics):  [phone]  Also offers weight checks, feeding discussions and support with a lactation consultant as a part of free, weekly support group.    [facility]: [phone]  Also offers a free weekly support group.                                                                                                                                                                                                       (continued)   You may also call [facility] at [phone]  for information about [facility] and [facility]  locations that offer lactation support. For information  about breastfeeding / chestfeeding and childbirth  classes in the [facility], go to [facility] at www.[employer]. For [facility], go to [email or web address] and click on  Classes and Events at the bottom of the page. Or, call  [phone].    Supplies    You can get breast pumps from the [facility] nurses  at [facility] or [facility]  nurses at [facility]. Call your health  insurance to see if they will cover the cost of the  pump. Tell your nurse you d like a pump. They will  help you fill out the right paperwork. The pump will  be ready for you when you leave the hospital.    If you decide to get a pump after you leave the  hospital, you can get one from our partners at  [facility]. [facility] carries a range of  feeding supplies and pumps. Please call your health  insurance to see if they will cover the cost of the  pump. Then call [facility]  to find out what supplies and pumps are available.  Some stores may deliver the pump to your home.    [facility]:  [email or web address] Other lactation services    [facility]: [phone] (24 hours a day)  [email or web address]  Offers support for breastfeeding / chestfeeding and  human milk feeding families. Call to find a group in  your area.    National [facility]:  [phone]  www.womenshealth.gov/breastfeeding  Offers a breastfeeding / chestfeeding information  line in English and Georgian.    WIC (Women, Infants and Children) Program:  [phone]  Offers breastfeeding / chestfeeding counseling. Call  to find an office near  you.    Milk banking    [facility]  [email or web address]  [phone]  Consider freezing your extra collected milk to donate  to babies in need. Email or call for information.                           If you are deaf or hard of hearing, please let us know. We provide many free services including  sign language interpreters, oral interpreters, TTYs, telephone amplifiers, note takers and written materials.

## 2021-06-15 NOTE — PROGRESS NOTES
Subjective:  25 y.o.  at 36w6d  No ctx, lof, bleeding, or dc.  MildHA.  No vision changes.    Outpatient Medications Prior to Visit   Medication Sig Dispense Refill     prenatal vitamin gummy (PRENATAL GUMMY) 400 mcg-35 mg -25 mg-5 mg Chew Chew 1 each daily. 100 tablet 3     No facility-administered medications prior to visit.       Social History     Tobacco Use   Smoking Status Never Smoker   Smokeless Tobacco Never Used      Objective:  /69 (Patient Site: Left Arm, Patient Position: Sitting, Cuff Size: Adult Regular)   Pulse 86   Wt 199 lb (90.3 kg)   LMP 06/10/2020   BMI 38.86 kg/m    GENERAL: alert, not distressed  CHEST: clear, no rales, rhonchi, or wheezes  CARDIAC: regular without murmur, gallop, or rub  ABDOMEN: gravid, soft, non tender, non distended, normal bowel sounds    Recent Results (from the past 24 hour(s))   Urinalysis, OB Screen (ketones, glucose, protein only)   Result Value Ref Range    Glucose, UA Negative Negative    Ketones, UA Negative Negative    Protein, UA Negative Negative mg/dL      Assessment and Plan:   1. Supervision of normal pregnancy in third trimester  Ultrasound today.    - Urinalysis, OB Screen (ketones, glucose, protein only)  - Group B Strep Screen by PCR    Return in 1 week (on 3/2/2021) for prenatal care.

## 2021-06-15 NOTE — TELEPHONE ENCOUNTER
----- Message from Jv Bass MD sent at 2/8/2021 12:33 PM CST -----  Call:  There was yeast present on the wet prep.  Let us treat with the medicine.  I sent a prescription to your pharmacy.

## 2021-06-15 NOTE — PROGRESS NOTES
Rib cage pain is both sides.  On exam no tenderness to palpation right subcostal or right upper quadrant.    No ctx, lof, bleeding, or dc.  No HA or vision changes.

## 2021-06-15 NOTE — PROGRESS NOTES
Subjective:  25 y.o.  at 35w6d  No ctx, lof, bleeding, or dc.  No HA or vision changes.  Yeast medicine helping    Outpatient Medications Prior to Visit   Medication Sig Dispense Refill     miconazole (MICONAZOLE 7) 2 % vaginal cream Insert 1 applicator into the vagina at bedtime for 7 doses. 45 g 0     prenatal vitamin gummy (PRENATAL GUMMY) 400 mcg-35 mg -25 mg-5 mg Chew Chew 1 each daily. 100 tablet 3     triamcinolone (KENALOG) 0.1 % cream Apply thin layer to affected areas twice daily as needed 45 g 0     No facility-administered medications prior to visit.       Social History     Tobacco Use   Smoking Status Never Smoker   Smokeless Tobacco Never Used      Objective:  /45 (Patient Site: Right Arm, Patient Position: Sitting, Cuff Size: Adult Regular)   Pulse 80   Wt 199 lb 4 oz (90.4 kg)   LMP 06/10/2020   BMI 38.91 kg/m    GENERAL: alert, not distressed  CHEST: clear, no rales, rhonchi, or wheezes  CARDIAC: regular without murmur, gallop, or rub  ABDOMEN: gravid, soft, non tender, non distended, normal bowel sounds    No results found for this or any previous visit (from the past 24 hour(s)).   Assessment and Plan:   1. Supervision of normal pregnancy in third trimester    Doing well.  Measures a little large.  Get ultrasound.  Patient prefers to wait for GBS until next week.  Follow up the.    - Urinalysis, OB Screen  - US OB > = 14 Weeks

## 2021-06-15 NOTE — PROGRESS NOTES
Subjective:  25 y.o.  at 38w6d  No ctx, lof, bleeding, or dc.  No HA or vision changes.    Hospital admission on  and discharged on  for pyelonephritis.  Continues on cephalexin.  No more back pain or fevers.    Outpatient Medications Prior to Visit   Medication Sig Dispense Refill     cephalexin (KEFLEX) 500 MG capsule Take 1 capsule (500 mg total) by mouth 4 (four) times a day for 10 days. 40 capsule 0     prenatal vitamin gummy (PRENATAL GUMMY) 400 mcg-35 mg -25 mg-5 mg Chew Chew 1 each daily. 100 tablet 3     No facility-administered medications prior to visit.       Meds reconciled.    Social History     Tobacco Use   Smoking Status Never Smoker   Smokeless Tobacco Never Used      Objective:  /67 (Patient Site: Right Arm, Patient Position: Sitting, Cuff Size: Adult Regular)   Pulse 75   Wt 200 lb (90.7 kg)   LMP 06/10/2020   BMI 39.06 kg/m    GENERAL: alert, not distressed  CHEST: clear, no rales, rhonchi, or wheezes  CARDIAC: regular without murmur, gallop, or rub  ABDOMEN: gravid, soft, non tender, non distended, normal bowel sounds    Recent Results (from the past 24 hour(s))   Urinalysis, OB Screen   Result Value Ref Range    Glucose, UA Negative Negative    Ketones, UA Negative Negative    Protein, UA Negative Negative      Assessment and Plan:   1. Encounter for supervision of normal pregnancy in third trimester  2 previous pregnancies went to 41 weeks.  Cervix not favorable for induction at 39 weeks.  Recheck next week.  - Urinalysis, OB Screen    2. Hospital discharge follow-up  3. Acute pyelonephritis in third trimester, antepartum  Appears to be resolving.  Finish antibiotics as prescribed.  Discussed the possibility of a prophylactic dose until the end of pregnancy.  We will see her next week and discuss it further then.      FMLA forms were completed.  I do not think she should work until after her maternity leave is completed.

## 2021-06-15 NOTE — PATIENT INSTRUCTIONS - HE
Patient Education   HEALTHY PREGNANCY CARE: 34-36 WEEKS PREGNANT    Your baby is gaining about an ounce each day, so healthy nutrition and rest are still very important. Learn about late pregnancy symptoms, such as constipation and backaches. Drinking more fluids and eating more fiber can relieve constipation. The pelvic tilt and a heating pad can ease backaches.    Continue to watch for signs and symptoms of preeclampsia:     Sudden swelling of your face, hands, or feet     New vision problems such as blurring, double vision, or flashing lights    A severe headache not relieved with acetaminophen (Tylenol)    Sharp or stabbing pain in your right or middle upper abdomen    You're almost done with your pregnancy but it's still too soon to have your baby. The goal is to have your baby after 37 weeks, so watch for signs and symptoms of premature labor:     Regular contractions. This means having about 6 or more within 1 hour, even after you have had a glass of water and are resting.     A backache that starts and stops regularly.    An increase or change in vaginal discharge, such as heavy, mucus-like, watery, or bloody discharge.     Your water breaks or leaks.    You will be tested for group B streptococcus (GBS), a type of bacteria found in 10-30% of pregnant women. A woman with GBS can pass it to her baby during delivery. Most babies who get GBS from their mothers do not have any problems, but some babies get very ill and need to be hospitalized for antibiotic treatment. Treating you with antibiotics during labor and delivery helps to prevent infection in your baby.    Review information on postpartum care that you will need after the baby is born.  Discuss your choices and plans for birth control with your midwife or physician.     Postpartum Care  During the days and weeks after the delivery of your baby (postpartum period), your body will change as it returns to its nonpregnant condition. As with pregnancy  "changes, postpartum changes are different for every woman.    Physical changes after childbirth  The changes in your body may include:    Contractions called afterpains shrink the uterus for several days after childbirth. Shrinking of the uterus to its prepregnancy size may take 6 to 8 weeks.    Sore muscles (especially in the arms, neck, or jaw) are common after childbirth. This is because of the hard work of labor and pushing your baby out. The soreness should go away in a few days.    Bleeding and vaginal discharge (lochia) may last for 2 to 8 weeks, and can come and go for about 2 months.    Vaginal soreness, including pain, discomfort, and numbness, is common after vaginal birth. Soreness may be worse if you had a perineal tear or episiotomy.    If you had a  birth (), you may have pain in your lower abdomen and may need pain medicine for 1 to 2 weeks.    Breast engorgement is common around the 3rd or 4th day after delivery, when the breasts begin to fill with milk. This can cause discomfort and swelling. If you are breast feeding, the best treatment is to feed your baby often or pump the milk out. You can also use warm compresses. If you are not breast feeding, placing ice packs on your breasts, taking a hot shower, or using warm compresses may relieve the discomfort.    Be aware of postpartum depression    \"Baby blues\" are common for the first 1 to 2 weeks after birth. You may cry or feel sad or irritable for no reason.     For some women, these feelings last longer and are more intense. This is called postpartum depression.     If your symptoms last for more than a few weeks or you feel very depressed, ask your midwife or physician for help.     Postpartum depression can be treated. Support groups and counseling can help, but sometimes medication is needed.     Discuss follow-up appointments with your midwife or physician, as well. He or she will usually want to see you 6 weeks after the " birth of your baby, sooner if you are having problems.    If you have questions about any symptoms you are experiencing or any other concerns, call your provider or their clinic staff at [facility] at Phone: [phone]. If it's after clinic hours, physician patients should call the [facility] at [phone] ([phone]); midwife patients should call their answering service at [phone].    How can you care for yourself at home?   You can refer to the Starting Out Right book or find it online at [email or web address]/images/[email or web address]/http://www.[email or web address]/images/[email or web address]/[email or web address]-[email or web address]-Right.pdf or [email or web address].[email or web address]/images/[email or web address]/flipbooks/[facility]-starting-out-right/[facility]-starting-[facility]-right.html#p=8     You can sign up for a weekly parenting e-mail that gives support, tips and advice from health care professionals that starts with pregnancy and continues through the toddler years. To register, go to [email or web address] at any time during your pregnancy.    Making Early Breastfeeding or Chestfeeding Work: What's Important?  Breastfeeding/chestfeeding is important!     It helps keep babies healthy.    Parents who breastfeed/chestfeed have lower risks of breast and ovarian cancer.    It's convenient: the milk is always ready and warm, and there is nothing to mix or prepare for feeding.    Formula is harder for your baby to digest.    It helps you bond with your baby and protects against postpartum depression.  Lots of early skin-to-skin contact with your baby    Place your naked baby with baby's belly against your bare chest. Cover baby's back with a blanket    Start skin-to-skin right after birth, as soon as you are ready    Skin-to-skin:  ? Helps keep baby warm  ? Improves baby's oxygen and blood sugar levels  ? Helps your uterus contract and bleed less  ? Helps baby feel calm and comforted  ? Helps you feel close to baby  ? Helps get  "breastfeeding started. Being close makes latching on easier, and baby may move over to the nipple and latch without help  ? Baby breastfeeds better and longer when skin-to-skin  Placing baby well for good attachment to the breast or chest    Hold your baby close with baby's tummy touching your tummy.    Wait for baby to open mouth wide, then bring baby onto the breast/chest.    Baby should take a big mouthful of breast/chest, not just the nipple. This helps baby get more milk, and the suckling should feel comfortable.    When baby is latched well to the breast/chest, nipples aren't cracked or painful.  Keeping baby near (called \"rooming-in\" at the hospital)    Baby sleeps better and cries less when birth parent is near. Your room is quiet.    We will place your baby safely on their back in their bassinette. This lets you practice safe sleep for your baby while keeping them at your bedside.    Baby feeds more often, which means your milk supply increases faster, and your baby loses less weight.    Parents have an easier time getting to know and bonding with baby.    Parents feel much more confident about baby care and breastfeeding/chestfeeding.    Maternity staff can help at any time.  Feeding on cue    Feeding on cue simply means feeding whenever your baby shows signs of hunger    Crying is a late hunger sign. Feed baby whenever baby wants for as long as baby wants.    Feeding signs: mouth movements, sticking the tongue out, rooting (baby turns toward chest and may open mouth), hand-to-mouth movements    Advantages of feeding on cue:  ? Frequent breastfeeding/chestfeeding in the first weeks after birth gives you a good milk supply for months to come.  ? Babies settle into a relaxing feed more quickly. Babies enjoy feedings more when they don't have to cry to be fed.  ? Feeding is comfort as well as nutrition. Newborns love constant closeness and feeding and can't be held \"too much\" or \"spoiled.\"  ? Newborns need " "small frequent feedings in the first days of life. Just one to three teaspoons fill a new baby's stomach.  ? Responding to feeding cues helps babies gain weight.  Feeding only human milk in the first six months    Colostrum is the first milk that baby gets at birth. The amount of colostrum matches the baby's stomach, so it will not be overfilled.    The small volumes ready at birth are also easier for baby to handle.    All babies lose weight in the first few days. This is simply \"water weight.\"    Introducing food or fluids other than human milk too early can cause problems for breastfeeding/chestfeeding and for your baby's health.    Feeding only human milk maximizes the protection against disease and infections.    Your body knows how much milk to make by how often your baby feeds. If you give your baby formula, your body may not know how much milk to make.    For informational purposes only. Not to replace the advice of your health care provider. Adapted with permission from \"Getting Started with Infant Care and Breastfeeding,\" by ANYA Moses, LIBRADO, Leeann Alvarez, RN, IBCLC, Antonia Osuna MD, LIBRADO, and Janet Verma MD, IBCLC. [place] [employer], 2017. Clinically reviewed by [facility]. [employer] 103816 - 05/19.        [facility] Breastfeeding Resources       Research shows that human milk offers the best  nutrition and protection for babies. So at [facility],  we care for families and babies in a way that  promotes, teaches and supports lactation. We  support all breastfeeding, chestfeeding and human  milk feeding families, as well as families who can t  breastfeed / chestfeed or who choose not to do so.  A mother or caregiver s own milk is best for a baby,  but if you are unable to breastfeed / chestfeed, we  may suggest pasteurized donor human milk for your  baby while in the hospital.    Lactation support    The following [facility]-affiliated locations offer  individual " outpatient lactation consults. Some  locations offer phone or group lactation consults  with certified lactation consultants. Call to confirm  services and for information and appointments. You  may wish to call your insurance company first to see  if they will cover the cost of a consult.    [facility]: [phone]    [facility]: [phone]    [facility]: [phone]  Offers [facility] First Days program, which includes  group lactation visits and one free information session  about delivering your baby at a designated [facility] hospital and the importance and benefits  of breastfeeding and human milk. These group visits  also help patients with feeding concerns and offer  information about other postpartum topics.                For informational purposes only. Not to replace the advice of your health care  provider. Copyright   2005 [facility]. All rights reserved. Ritani [id number] - REV .   [facility] ([place]):  [phone]    [facility]):  [phone] or [phone]    [facility]):  [phone]    [facility]  ([place]): [phone]    [facility] ([place]):  [phone] or [phone]  Includes follow-up visits for caregivers of babies  discharged from the  intensive care unit  (NICU) at [facility].    [facility]):  [phone]    [facility] ([facility],  [facility], primary care clinics):  [phone]  Also offers weight checks, feeding discussions and support with a lactation consultant as a part of free, weekly support group.    [facility]: [phone]  Also offers a free weekly support group.                                                                                                                                                                                                       (continued)   You may also call [facility] at [phone]  for information about [facility] and [facility]  locations that offer lactation support. For information  about breastfeeding / chestfeeding and childbirth  classes in the [facility], go to [facility] at [email or web address]. For [facility], go to [email or web address] and click on  Classes and Events at the bottom of the page. Or, call  [phone].    Supplies    You can get breast pumps from the Birth[facility] nurses  at [facility] or [facility]  nurses at [facility]. Call your health  insurance to see if they will cover the cost of the  pump. Tell your nurse you d like a pump. They will  help you fill out the right paperwork. The pump will  be ready for you when you leave the hospital.    If you decide to get a pump after you leave the  hospital, you can get one from our partners at  [facility]. [facility] carries a range of  feeding supplies and pumps. Please call your health  insurance to see if they will cover the cost of the  pump. Then call [facility]  to find out what supplies and pumps are available.  Some stores may deliver the pump to your home.    [facility]:  [email or web address] Other lactation services    [facility] Kim: [phone] (24 hours a day)  [email or web address]  Offers support for breastfeeding / chestfeeding and  human milk feeding families. Call to find a group in  your area.    [facility]:  [phone]  www.womenshealth.gov/breastfeeding  Offers a breastfeeding / chestfeeding information  line in English and Armenian.    WIC (Women, Infants and Children) Program:  [phone]  Offers breastfeeding / chestfeeding counseling. Call  to find an office near  you.    Milk banking    [facility]  [email or web address]  [phone]  Consider freezing your extra collected milk to donate  to babies in need. Email or call for information.                           If you are deaf or hard of hearing, please let us know. We provide many free services including  sign language interpreters, oral interpreters, TTYs, telephone amplifiers, note takers and written materials.

## 2021-06-15 NOTE — PATIENT INSTRUCTIONS - HE
Patient Education   HEALTHY PREGNANCY CARE: 37 to 41 WEEKS PREGNANT    Talk with your midwife or physician about when to call with signs of labor    Regular uterine contractions that are getting closer together and/or stronger    If you think your water has broken or is leaking    Bleeding from the vagina like a period (bloody vaginal discharge is normal)    If you are not feeling your baby move    Make plans for transportation and  as needed for when you are going to the hospital.    Your midwife or physician may offer to check your cervix for changes.     Ask your health care provider about vaccinations you may need following delivery. By now, you should have received a Tdap immunization to protect against pertussis or whooping cough. Fathers and family members who will be in close contact with the baby should also receive a Tdap shot at least two weeks before the expected birth of the baby if they have not had a Td (tetanus) shot for at least two years.    If you are past your due date, discuss the next steps leading to delivery with your midwife or physician. If you don't start labor on your own by 41 or 42 weeks, your midwife or physician may recommend giving you medicines to ripen your cervix and start labor.    Preparing for your baby: Tell your midwife or physician how you plan to feed your baby (breast or bottle), who you have chosen to do pediatric care for your baby, and if you have a boy, whether you have chosen to have him circumcised. You will need a car seat correctly installed in your vehicle to bring your baby home. As you start to set up the nursery at home for your baby, make sure the crib is safe. The mattress needs to fit snugly against the edges of the crib. If you can fit a soda can between the bars, they are too far apart and can allow the baby's head to caught between them.    Learn about infant care and feeding, including information about infant CPR. We recommend that you put  your baby to sleep on his or her back to reduce the chance of Sudden Infant Death Syndrome (SIDS). To maintain a healthy environment in which your child can grow, it's best to keep your home smoke-free. By preparing ahead, your transition into parenthood will go smoothly for you and your baby.    Your midwife or physician will want to see you for a checkup 2 to 6 weeks after delivery.    If you have questions about any symptoms you are experiencing or any other concerns, call your provider or their clinic staff at [facility] at Phone: [phone]. If it's after clinic hours, physician patients should call the [facility] at [phone] ([phone]); midwife patients should call their answering service at [phone].    How can you care for yourself at home?   You can refer to the Starting Out Right book or find it online at [email or web address] or [email or web address]     You can sign up for a weekly parenting e-mail that gives support, tips and advice from health care professionals that starts with pregnancy and continues through the toddler years. To register, go to [email or web address] at any time during your pregnancy.    Making Early Breastfeeding or Chestfeeding Work: What's Important?  Breastfeeding/chestfeeding is important!     It helps keep babies healthy.    Parents who breastfeed/chestfeed have lower risks of breast and ovarian cancer.    It's convenient: the milk is always ready and warm, and there is nothing to mix or prepare for feeding.    Formula is harder for your baby to digest.    It helps you bond with your baby and protects against postpartum depression.  Lots of early skin-to-skin contact with your baby    Place your naked baby with baby's belly against your bare chest. Cover baby's back with a  "blanket    Start skin-to-skin right after birth, as soon as you are ready    Skin-to-skin:  ? Helps keep baby warm  ? Improves baby's oxygen and blood sugar levels  ? Helps your uterus contract and bleed less  ? Helps baby feel calm and comforted  ? Helps you feel close to baby  ? Helps get breastfeeding started. Being close makes latching on easier, and baby may move over to the nipple and latch without help  ? Baby breastfeeds better and longer when skin-to-skin  Placing baby well for good attachment to the breast or chest    Hold your baby close with baby's tummy touching your tummy.    Wait for baby to open mouth wide, then bring baby onto the breast/chest.    Baby should take a big mouthful of breast/chest, not just the nipple. This helps baby get more milk, and the suckling should feel comfortable.    When baby is latched well to the breast/chest, nipples aren't cracked or painful.  Keeping baby near (called \"rooming-in\" at the hospital)    Baby sleeps better and cries less when birth parent is near. Your room is quiet.    We will place your baby safely on their back in their bassinette. This lets you practice safe sleep for your baby while keeping them at your bedside.    Baby feeds more often, which means your milk supply increases faster, and your baby loses less weight.    Parents have an easier time getting to know and bonding with baby.    Parents feel much more confident about baby care and breastfeeding/chestfeeding.    Maternity staff can help at any time.  Feeding on cue    Feeding on cue simply means feeding whenever your baby shows signs of hunger    Crying is a late hunger sign. Feed baby whenever baby wants for as long as baby wants.    Feeding signs: mouth movements, sticking the tongue out, rooting (baby turns toward chest and may open mouth), hand-to-mouth movements    Advantages of feeding on cue:  ? Frequent breastfeeding/chestfeeding in the first weeks after birth gives you a good milk " "supply for months to come.  ? Babies settle into a relaxing feed more quickly. Babies enjoy feedings more when they don't have to cry to be fed.  ? Feeding is comfort as well as nutrition. Newborns love constant closeness and feeding and can't be held \"too much\" or \"spoiled.\"  ? Newborns need small frequent feedings in the first days of life. Just one to three teaspoons fill a new baby's stomach.  ? Responding to feeding cues helps babies gain weight.  Feeding only human milk in the first six months    Colostrum is the first milk that baby gets at birth. The amount of colostrum matches the baby's stomach, so it will not be overfilled.    The small volumes ready at birth are also easier for baby to handle.    All babies lose weight in the first few days. This is simply \"water weight.\"    Introducing food or fluids other than human milk too early can cause problems for breastfeeding/chestfeeding and for your baby's health.    Feeding only human milk maximizes the protection against disease and infections.    Your body knows how much milk to make by how often your baby feeds. If you give your baby formula, your body may not know how much milk to make.    For informational purposes only. Not to replace the advice of your health care provider. Adapted with permission from \"Getting Started with Infant Care and Breastfeeding,\" by ANYA Moses, LIBRADO, Leeann Alvarez, RN, IBCLC, Antonia Osuna MD, LIBRADO, and Janet Verma MD, IBCLC. [place] [employer], 2017. Clinically reviewed by [facility]. [employer] 457384 - 05/19.        [facility] Resources     Research shows that human milk offers the best  nutrition and protection for babies. So at [facility],  we care for families and babies in a way that  promotes, teaches and supports lactation. We  support all breastfeeding, chestfeeding and human  milk feeding families, as well as families who can t  breastfeed / chestfeed or who " choose not to do so.  A mother or caregiver s own milk is best for a baby,  but if you are unable to breastfeed / chestfeed, we  may suggest pasteurized donor human milk for your  baby while in the hospital.    Lactation support    The following [facility] locations offer  individual outpatient lactation consults. Some  locations offer phone or group lactation consults  with certified lactation consultants. Call to confirm  services and for information and appointments. You  may wish to call your insurance company first to see  if they will cover the cost of a consult.    [facility]: [phone]    [facility]: [phone]    [facility]: [phone]  Offers [facility] First Days program, which includes  group lactation visits and one free information session  about delivering your baby at a designated [facility] hospital and the importance and benefits  of breastfeeding and human milk. These group visits  also help patients with feeding concerns and offer  information about other postpartum topics.                For informational purposes only. Not to replace the advice of your health care provider. Copyright   2005 [facility]. All rights reserved. [employer] 142227 - REV          [facility] ([place]):  [phone]    [facility] ([place]):  [phone] or [phone]    [facility]):  [phone]    [facility]  ([place]): [phone]    [facility] ([place]):  [phone] or [phone]  Includes follow-up visits for caregivers of babies  discharged from the  intensive care unit  (NICU) at [facility].    [facility]):  [phone]    [facility] ([facility],  [facility], primary care clinics):  [phone]  Also offers  weight checks, feeding discussions and support with a lactation consultant as a part of free, weekly support group.    [facility]: [phone]  Also offers a free weekly support group.                                                                                                                                                                                                      (continued)   You may also call [facility] at [phone]  for information about [facility] and [facility]  locations that offer lactation support. For information  about breastfeeding / chestfeeding and childbirth  classes in the [facility], go to [facility] at [email or web address]. For [facility], go to [email or web address] and click on  Classes and Events at the bottom of the page. Or, call  [phone].    Supplies    You can get breast pumps from the Birth[facility] nurses  at [facility] or [facility]  nurses at [facility]. Call your health  insurance to see if they will cover the cost of the  pump. Tell your nurse you d like a pump. They will  help you fill out the right paperwork. The pump will  be ready for you when you leave the hospital.    If you decide to get a pump after you leave the  hospital, you can get one from our partners at  [facility]. [facility] carries a range of  feeding supplies and pumps. Please call your health  insurance to see if they will cover the cost of the  pump. Then call [facility]  to find out what supplies and pumps are available.  Some stores may deliver the pump to your home.    [facility]:  [email or web address] Other lactation services    [facility]: [phone] (24 hours a day)  [email or web address]  Offers support for breastfeeding / chestfeeding and  human milk feeding families. Call to find a group in  your area.    National Women [facility]  Information Center:  [phone]  www.womenshealth.gov/breastfeeding  Offers a breastfeeding / chestfeeding information  line in English and Persian.    WIC (Women, Infants and Children) Program:  [phone]  Offers breastfeeding / chestfeeding counseling. Call  to find an office near you.    Milk banking    [facility]  [email or web address]  [phone]  Consider freezing your extra collected milk to donate  to babies in need. Email or call for information.                       If you are deaf or hard of hearing, please let us know. We provide many free services including  sign language interpreters, oral interpreters, TTYs, telephone amplifiers, note takers and written materials.

## 2021-06-16 NOTE — PATIENT INSTRUCTIONS - HE
Patient Education   HEALTHY PREGNANCY CARE: 37 to 41 WEEKS PREGNANT    Talk with your midwife or physician about when to call with signs of labor    Regular uterine contractions that are getting closer together and/or stronger    If you think your water has broken or is leaking    Bleeding from the vagina like a period (bloody vaginal discharge is normal)    If you are not feeling your baby move    Make plans for transportation and  as needed for when you are going to the hospital.    Your midwife or physician may offer to check your cervix for changes.     Ask your health care provider about vaccinations you may need following delivery. By now, you should have received a Tdap immunization to protect against pertussis or whooping cough. Fathers and family members who will be in close contact with the baby should also receive a Tdap shot at least two weeks before the expected birth of the baby if they have not had a Td (tetanus) shot for at least two years.    If you are past your due date, discuss the next steps leading to delivery with your midwife or physician. If you don't start labor on your own by 41 or 42 weeks, your midwife or physician may recommend giving you medicines to ripen your cervix and start labor.    Preparing for your baby: Tell your midwife or physician how you plan to feed your baby (breast or bottle), who you have chosen to do pediatric care for your baby, and if you have a boy, whether you have chosen to have him circumcised. You will need a car seat correctly installed in your vehicle to bring your baby home. As you start to set up the nursery at home for your baby, make sure the crib is safe. The mattress needs to fit snugly against the edges of the crib. If you can fit a soda can between the bars, they are too far apart and can allow the baby's head to caught between them.    Learn about infant care and feeding, including information about infant CPR. We recommend that you put  your baby to sleep on his or her back to reduce the chance of Sudden Infant Death Syndrome (SIDS). To maintain a healthy environment in which your child can grow, it's best to keep your home smoke-free. By preparing ahead, your transition into parenthood will go smoothly for you and your baby.    Your midwife or physician will want to see you for a checkup 2 to 6 weeks after delivery.    If you have questions about any symptoms you are experiencing or any other concerns, call your provider or their clinic staff at [facility] at Phone: [phone]. If it's after clinic hours, physician patients should call the [facility] Connection at [phone] ([phone]); midwife patients should call their answering service at [phone].    How can you care for yourself at home?   You can refer to the Starting Out Right book or find it online at [email or web address] or [email or web address]/[email or web address]#[email or web address]     You can sign up for a weekly parenting e-mail that gives support, tips and advice from health care professionals that starts with pregnancy and continues through the toddler years. To register, go to [email or web address] at any time during your pregnancy.    Making Early Breastfeeding or Chestfeeding Work: What's Important?  Breastfeeding/chestfeeding is important!     It helps keep babies healthy.    Parents who breastfeed/chestfeed have lower risks of breast and ovarian cancer.    It's convenient: the milk is always ready and warm, and there is nothing to mix or prepare for feeding.    Formula is harder for your baby to digest.    It helps you bond with your baby and protects against postpartum depression.  Lots of early skin-to-skin contact with your baby    Place your naked baby with baby's belly against your bare chest. Cover baby's back with a  "blanket    Start skin-to-skin right after birth, as soon as you are ready    Skin-to-skin:  ? Helps keep baby warm  ? Improves baby's oxygen and blood sugar levels  ? Helps your uterus contract and bleed less  ? Helps baby feel calm and comforted  ? Helps you feel close to baby  ? Helps get breastfeeding started. Being close makes latching on easier, and baby may move over to the nipple and latch without help  ? Baby breastfeeds better and longer when skin-to-skin  Placing baby well for good attachment to the breast or chest    Hold your baby close with baby's tummy touching your tummy.    Wait for baby to open mouth wide, then bring baby onto the breast/chest.    Baby should take a big mouthful of breast/chest, not just the nipple. This helps baby get more milk, and the suckling should feel comfortable.    When baby is latched well to the breast/chest, nipples aren't cracked or painful.  Keeping baby near (called \"rooming-in\" at the hospital)    Baby sleeps better and cries less when birth parent is near. Your room is quiet.    We will place your baby safely on their back in their bassinette. This lets you practice safe sleep for your baby while keeping them at your bedside.    Baby feeds more often, which means your milk supply increases faster, and your baby loses less weight.    Parents have an easier time getting to know and bonding with baby.    Parents feel much more confident about baby care and breastfeeding/chestfeeding.    Maternity staff can help at any time.  Feeding on cue    Feeding on cue simply means feeding whenever your baby shows signs of hunger    Crying is a late hunger sign. Feed baby whenever baby wants for as long as baby wants.    Feeding signs: mouth movements, sticking the tongue out, rooting (baby turns toward chest and may open mouth), hand-to-mouth movements    Advantages of feeding on cue:  ? Frequent breastfeeding/chestfeeding in the first weeks after birth gives you a good milk " "supply for months to come.  ? Babies settle into a relaxing feed more quickly. Babies enjoy feedings more when they don't have to cry to be fed.  ? Feeding is comfort as well as nutrition. Newborns love constant closeness and feeding and can't be held \"too much\" or \"spoiled.\"  ? Newborns need small frequent feedings in the first days of life. Just one to three teaspoons fill a new baby's stomach.  ? Responding to feeding cues helps babies gain weight.  Feeding only human milk in the first six months    Colostrum is the first milk that baby gets at birth. The amount of colostrum matches the baby's stomach, so it will not be overfilled.    The small volumes ready at birth are also easier for baby to handle.    All babies lose weight in the first few days. This is simply \"water weight.\"    Introducing food or fluids other than human milk too early can cause problems for breastfeeding/chestfeeding and for your baby's health.    Feeding only human milk maximizes the protection against disease and infections.    Your body knows how much milk to make by how often your baby feeds. If you give your baby formula, your body may not know how much milk to make.    For informational purposes only. Not to replace the advice of your health care provider. Adapted with permission from \"Getting Started with Infant Care and Breastfeeding,\" by ANYA Moses, LIBRADO, Leeann Alvarez, RN, IBCLC, Antonia Osuna MD, LIBRADO, and Janet Verma MD, IBCLC. [place] [employer], 2017. Clinically reviewed by [facility]. [employer] [id number] - 05/19.        [facility] Resources     Research shows that human milk offers the best  nutrition and protection for babies. So at [facility],  we care for families and babies in a way that  promotes, teaches and supports lactation. We  support all breastfeeding, chestfeeding and human  milk feeding families, as well as families who can t  breastfeed / chestfeed or who " choose not to do so.  A mother or caregiver s own milk is best for a baby,  but if you are unable to breastfeed / chestfeed, we  may suggest pasteurized donor human milk for your  baby while in the hospital.    Lactation support    The following [facility] locations offer  individual outpatient lactation consults. Some  locations offer phone or group lactation consults  with certified lactation consultants. Call to confirm  services and for information and appointments. You  may wish to call your insurance company first to see  if they will cover the cost of a consult.    [facility]: [phone]    [facility]: [phone]    [facility]: [phone]  Offers [place] First Days program, which includes  group lactation visits and one free information session  about delivering your baby at a designated [facility] hospital and the importance and benefits  of breastfeeding and human milk. These group visits  also help patients with feeding concerns and offer  information about other postpartum topics.                For informational purposes only. Not to replace the advice of your health care provider. Copyright   2005 [facility]. All rights reserved. [employer] 113956 - REV          [facility] ([place]):  [phone]    [facility] ([place]):  [phone] or [phone]    [facility]):  [phone]    [facility]  ([place]): [phone]    [facility] ([place]):  [phone] or [phone]  Includes follow-up visits for caregivers of babies  discharged from the  intensive care unit  (NICU) at [facility].    [facility]):  [phone]    [facility] ([facility],  [facility], primary care clinics):  [phone]  Also offers  weight checks, feeding discussions and support with a lactation consultant as a part of free, weekly support group.    [facility]: [phone]  Also offers a free weekly support group.                                                                                                                                                                                                      (continued)   You may also call [facility] at [phone]  for information about [facility] and [facility]  locations that offer lactation support. For information  about breastfeeding / chestfeeding and childbirth  classes in the [facility], go to [facility] at [email or web address]. For [facility], go to [email or web address] and click on  Classes and Events at the bottom of the page. Or, call  [phone].    Supplies    You can get breast pumps from the Birthplace nurses  at [facility] or [facility]  nurses at [facility]. Call your health  insurance to see if they will cover the cost of the  pump. Tell your nurse you d like a pump. They will  help you fill out the right paperwork. The pump will  be ready for you when you leave the hospital.    If you decide to get a pump after you leave the  hospital, you can get one from our partners at  [facility]. [facility] carries a range of  feeding supplies and pumps. Please call your health  insurance to see if they will cover the cost of the  pump. Then call [facility]  to find out what supplies and pumps are available.  Some stores may deliver the pump to your home.    [facility]:  [email or web address] Other lactation services    [facility]: [phone] (24 hours a day)  www.[email or web address]  Offers support for breastfeeding / chestfeeding and  human milk feeding families. Call to find a group in  your area.    National Women s Health  Information Center:  [phone]  www.womenshealth.gov/breastfeeding  Offers a breastfeeding / chestfeeding information  line in English and Arabic.    WIC (Women, Infants and Children) Program:  [phone]  Offers breastfeeding / chestfeeding counseling. Call  to find an office near you.    Milk banking    [facility]  [email or web address]  [phone]  Consider freezing your extra collected milk to donate  to babies in need. Email or call for information.                       If you are deaf or hard of hearing, please let us know. We provide many free services including  sign language interpreters, oral interpreters, TTYs, telephone amplifiers, note takers and written materials.

## 2021-06-16 NOTE — PROGRESS NOTES
No lof, bleeding, or dc.  No HA or vision changes.   Having a few contractions.  Has     Discussed post dates status.    NST:   FHT: Baseline 140.  Variability is moderate.  No accelerations.  On short deceleration 20 seconds shon 100. Probably variable morphology.  Perhaps late, but uterine activity irregular and frequent.  TOCO  Very irritable.    With post dates an this NST, recommend further evaluation at hospital.  My recommendation is for induction/augmentation of labor, as she is likely in early labor and post dates, with a favorable Richards score.  She will proceed to maternity care center.  I contacted the charge nurse.

## 2021-06-16 NOTE — PROGRESS NOTES
Subjective:  25 y.o.  at 39w6d  No lof, bleeding, or dc.  No HA or vision changes.  Some ctx a couple nights ago.  She was eventually able to fall asleep and the contractions stopped.    Outpatient Medications Prior to Visit   Medication Sig Dispense Refill     prenatal vitamin gummy (PRENATAL GUMMY) 400 mcg-35 mg -25 mg-5 mg Chew Chew 1 each daily. 100 tablet 3     No facility-administered medications prior to visit.       Social History     Tobacco Use   Smoking Status Never Smoker   Smokeless Tobacco Never Used      Objective:  /74 (Patient Site: Left Arm, Patient Position: Sitting, Cuff Size: Adult Regular)   Pulse 72   Wt 199 lb 12 oz (90.6 kg)   LMP 06/10/2020   BMI 39.01 kg/m    GENERAL: alert, not distressed  CHEST: clear, no rales, rhonchi, or wheezes  CARDIAC: regular without murmur, gallop, or rub  ABDOMEN: gravid, soft, non tender, non distended, normal bowel sounds    Recent Results (from the past 24 hour(s))   Urinalysis, OB Screen (ketones, glucose, protein only)   Result Value Ref Range    Glucose, UA Negative Negative    Ketones, UA Negative Negative    Protein, UA Negative Negative      Assessment and Plan:   1. Encounter for supervision of normal pregnancy in third trimester  Cervix closed  Discussed no indication for delivery at this time.  Expectant management until 41 weeks.  She is interested in induction.  Labor signs discussed.  Follow up next week.

## 2021-06-16 NOTE — PROGRESS NOTES
Some ctx.  Not very strong.  No lof, bleeding, or dc.  No HA or vision changes.   Previous pregnancy went nearly to 42 weeks.

## 2021-06-16 NOTE — PROGRESS NOTES
Had some ctx after last visit.  They eventually spaced out and stopped.  No lof, bleeding, or dc.  No HA or vision changes.

## 2021-06-17 NOTE — PROGRESS NOTES
Post Partum Check:    Delivery at 40w5d now .  Date of delivery: 3/22/2021    Patient concerns: none    Bleeding: no concerns    Pain: no concerns    LMP:Patient's last menstrual period was 06/10/2020.     Depression: no concerns  EPDS Score: 0    Contraception Plan:  Declines today  Perhaps vasectomy for .    Immunizations needed:  COVID vaccine recommended.  Will hold off on MMR in anticipation of COVID vaccine.   She has had 4 lifetime doses.    Pap smear:  Done today    Assessment:  Well post partum check up.    Plan:  Follow up as needed.  Keep PNV if at risk for pregnancy.    Patient plans to schedule COVID vaccine via [facility] [facility].

## 2021-06-17 NOTE — PATIENT INSTRUCTIONS - HE
Follow up as needed.  Keep PNV if at risk for pregnancy.    COVID vaccine is most important.  [facility], or call us for help if needed.    We could repeat the MMR.  You have have 4 in your lifetime that we have record of.  (The usual is 2).  Your prenatal labs indicated that you were not immune to Rubella, which is what we are testing for.  Rubella is bad to get while you are pregnant.

## 2021-06-17 NOTE — PROGRESS NOTES
Subjective:    Davey Rooney is a 22 y.o. female who presents for contraception management.  She is postpartum.  She would like Nexplanon for birth control.  Patient's last menstrual period was 05/03/2018.  She denies any unprotected sex within the past 2 weeks.  Risks, benefits, possible side effects of Nexplanon were discussed with patient.  All of her questions were answered.     Patient Active Problem List   Diagnosis     Rubella non-immune status, antepartum     Nexplanon insertion (5/9/18)     No current outpatient prescriptions on file.    Current Facility-Administered Medications:      lidocaine 1%-EPINEPHrine 1:100,000 1 %-1:100,000 injection 2.5 mL (XYLOCAINE W/EPI), 2.5 mL, Other, Once, April Hu PA-C    Objective:   Allergies:  Review of patient's allergies indicates no known allergies.    Vitals:    05/09/18 1530   BP: 98/54   Pulse: 60   Resp: 16     Body mass index is 32.61 kg/(m^2).    General: Alert and oriented x 3, in no apparent distress    Procedure:  Left upper inner arm was adequately anesthetized with 2.5 cc of lidocaine with Epi.  Then, using sterile technique, Nexplanon was inserted and harvey was deployed without difficulty.  Nexplanon harvey was palpable subcutaneously by myself.  Insertion site was covered with a band-aid and area was wrapped with a pressure bandage.  Patient was neurovascularly intact after exam.  Proper wound aftercare was dicussed with patient.    Results for orders placed or performed in visit on 05/09/18   Pregnancy (Beta-hCG, Qual), Urine   Result Value Ref Range    Pregnancy Test, Urine Negative Negative    Specific Gravity, UA <=1.005 1.001 - 1.030        Assessment and Plan:   1.  Nexplanon insertion.  Insertion date: 5/9/2018  3 Year expiration date: 5/9/2021  Consent form was reviewed with patient, signed, and will be scanned in to her chart.  She knows to use backup birth control for the next 2 weeks.    This dictation uses voice recognition software,  which may contain typographical errors.

## 2021-06-17 NOTE — PROGRESS NOTES
Post Partum Check:  Delivery at 41w2d now .  Date of delivery: 3/22/18    Patient concerns:   LMP now.  Not breast feeding    Bleeding: no concerns    Pain: no concerns    LMP:Patient's last menstrual period was 2018.     Depression: no concerns  EPDS Score: 3    Contraception Plan:  Interested in LARC/Nexplanon.  Scheduled to have this done soon.    Immunizations needed:  none  MMR done post delivery    Pap smear:  Pap done today.    Assessment:  Well post partum check up.    Plan:  1. Cervical cancer screening  - Gynecologic Cytology (PAP Smear)     Scheduled for nexplanon soon.

## 2021-06-18 NOTE — PATIENT INSTRUCTIONS - HE
Patient Instructions by Jv Bass MD at 12/14/2020 10:20 AM     Author: Jv Bass MD Service: -- Author Type: Physician    Filed: 12/14/2020 11:33 AM Encounter Date: 12/14/2020 Status: Signed    : Jv Bass MD (Physician)         Patient Education   HEALTHY PREGNANCY CARE: 26-30 WEEKS PREGNANT    You are now in your last trimester of pregnancy. Your baby is growing rapidly, can open and close her eyelids, sometimes get hiccups, and you'll probably feel her moving around more often. Your baby has breathing movements and is gaining about one ounce each day. You may notice heartburn and leg cramps. Your back may ache as your body gets used to your baby's size and length.    Discuss your work situation with your midwife or physician as needed. If you stand for long periods of time, you may need to make changes and take breaks.    Pre-register online at the hospital where your baby will be born ([email or web address])     Be aware of your baby's activity level. You may be asked to do daily fetal movement counts. Contact your midwife or physician about any decreased movement.    You may have been tested for gestational diabetes today. If you are RH negative, you may have had an additional test and a Rhogam injection.    Consider receiving a Tdap vaccination to protect your baby from Pertussis/whooping cough.    If you are considering tubal ligation, discuss this with your midwife or physician. You will need to have an appointment with the obstetrician who will do the surgery to discuss the risks, benefits, and alternatives, and to sign the consent. This can be discussed at your next visit.    Continue to watch for any signs or symptoms of premature labor:     Regular contractions. This means having about 6 or more within 1 hour, even after you have had a glass of water and are resting.     A backache that starts and stops regularly.    An  "increase or change in vaginal discharge, such as heavy, mucus-like, watery, or bloody discharge.     Your water breaks or leaks.    Continue to watch for signs and symptoms of preeclampsia:     Sudden swelling of your face, hands, or feet     New vision problems such as blurring, double vision, or flashing lights    A severe headache not relieved with acetaminophen (Tylenol)    Sharp or stabbing pain in your right or middle upper abdomen    If you have any of the above symptoms or any other concerns, call your midwife or physician or their clinic staff at [facility] at Phone: [phone]. If it's after clinic hours, physician patients should call the [facility] at [phone] ([phone]); midwife patients should call their answering service at [phone].    How can you care for yourself at home?   You can refer to the Starting Out Right book or find it online at [email or web address] or [email or web address]#p=8     You can sign up for a weekly parenting e-mail that gives support, tips and advice from health care professionals that starts with pregnancy and continues through the toddler years. To register, go to [email or web address] at any time during your pregnancy.    BREASTFEEDING TIPS FOR NEW MOMS     Importance of skin-to-skin contact:  ? Gets breastfeeding off to a good start  ? Keeps baby warm and is great for bonding  ? Provides calming effects and helps to stabilize breathing and  blood sugar  ? Helps the uterus to contract and bleed less    Importance of feeding whenever baby shows signs of  being hungry, \"feeding on cue\":  ? Helps create a good milk supply appropriate to the babys needs  ? Less breastfeeding complications such as engorgement or  low supply  ? Helps baby get enough milk  ? Milk supply is determined by " how often baby nurses and empties  the breast.  ? Feeding is for comfort as well as nutrition    Importance of good latch (positioning and attaching  baby properly at breast):  ? Helps prevent sore nipples  ? Helps ensure baby gets enough milk and supports moms breast  milk supply    Risks of giving baby supplements other  than moms breastmilk  Breastfeeding alone is recommended for the first 6 months, if not it:  ? Can make baby more prone to illness  ? Can make baby less satisfied at breast (wanting larger amounts or  faster flow)  ? Reduces milk supply    Importance of rooming-in:  ? Increases parent confidence while mother is supported by the  hospital staff  ? Caregivers learn how to care for baby and recognize feeding cues  ? Enables feeding whenever baby needs to eat  ? Baby is comforted with mom and learns to recognize caregivers    Avoiding use of pacifiers:  ? Use of pacifiers in the first weeks can make it difficult to make a full  milk supply  ? May interfere with baby learning to latch well      2017 tg [id number]. SW [id number]. DOD 11.17           Breastfeeding   Why Its Important and What to Expect     Your breast milk is the best food for your baby--and  breastfeeding can help you be healthy as well.    Though breastfeeding is natural, it is a learned process for both mother and baby. To prepare, there are things you can learn--and do--before your baby is born.    ? Learn the benefits of breastfeeding.    ? Understand the basic process.    ? Know what to expect in the hospital.    ? Arrange breastfeeding support for the first few  weeks after birth.    ? Take a breastfeeding class (see back page).    ? Talk to your midwife, nurse or doctor if you have  Questions.    The benefits of breastfeeding    Human milk changes to meet the needs of a growing baby. It is all a baby needs for the first six months of life.    In fact, babies who receive only human milk for the  first six months are less likely to  develop colds, the  flu, colic, asthma, ear infections, food allergies and  diarrhea (loose, watery stools). They may be less likely      to be overweight as children, and they are less likely to develop diabetes later in life. Some studies also show that infants have a higher IQ if they are .    Breastfeeding can:    ? Help you and your baby develop a special bond--  and make you feel proud that you can feed your  Baby.    ? Reduce the total amount of blood you will lose  after delivery.    ? Help your uterus return to its non-pregnant size.    ? Reduce the risk of Sudden Infant Death Syndrome (SIDS).    ? Help you lose your pregnancy weight more quickly.    ? Help delay the return of your monthly periods.    ? Lower your risk of some breast and ovarian  cancers--as well as osteoporosis (bone loss)--later in life.    ? Save you more than $300 per month. (This  includes the cost of formula and medical bills.  Formula-fed babies get sick more often.)          If you are deaf or hard of hearing, please let us know. We provide many free services including  sign language interpreters, oral interpreters, TTYs, telephone amplifiers, note takers and written materials.        How to breastfeed    Skin-to-skin contact    Hold your baby on your chest skin-to-skin right after  birth. Skin contact calms your baby, steadies their  breathing and keeps your baby warm. Your baby will be alert and will likely want to feed within the first hour after birth.    Babies are born with reflexes that help them  breastfeed. Your body will be ready with early milk  (called colostrum), so you will have all the milk your  baby needs for that first feeding. Your nurse will help you get started.    Keep your baby with you and breastfeed whenever  your baby is hungry. Offering the breast early and  often helps your body keep making lots of milk.    How to position your baby    There are many positions for breastfeeding.              No  matter which position you choose, support your  babys back, shoulders and neck. The head and body should be in a straight line, and the entire body should face the breast. Your baby should be able to tilt the head back easily. Your baby shouldnt have to reach out to feed. Also make sure your babys nose is level with your nipple. This way, your baby will find it easier to attach to your breast.    Finally, get comfortable. Use pillows to support your  body. Dont lean over or slump to reach your baby.  Once your baby is attached to the breast, its okay to change your position slightly.    You will feel a bit of a tugging at first, but you should  never feel pain. If you do, ask your nurse or lactation expert for help. She will teach you how to latch your baby onto the breast in a way that feels more comfortable.    Breastfeeding in the hospital    For the first three days after birth, your body will  produce early milk called colostrum. This milk is  full of calories and antibodies to help keep your baby healthy. It is all your baby needs for the first few days.    Remember, babies do not eat anything while inside  you. Right after birth, your baby will only need a little bit of milk (about 1 teaspoon per feeding) to get the digestive system working well. Your baby will not need any formula--your body will make the right amount of milk.    Breastfeed whenever your baby shows signs of hunger. (See next page for a list of signs.) Crying is a late sign of hunger.    Babies often lose weight in the days after birth. This  is normal. By two weeks of age, your baby should be back to their birth weight. Your care team will watch your babys weight carefully.    If you are unable to breastfeed in the hospital, your  care team may suggest pasteurized donor human milk for your baby.             The Most Important Points to Remember    ? Your breast milk is the perfect food for your  baby. Breastfeeding has a lot of health  benefits  for you as well.    ? Hold your baby skin-to-skin as soon as possible  after birth. Do this for as long as you can. Even  if your baby doesnt go to the breast right away,  skin-to-skin contact helps your body make  more milk. This lets you get an early start on  Breastfeeding.    ? Learn how to position your baby at the breast.  This will help your baby feed well, and it will  keep you comfortable.    ? Feed your baby whenever your baby wants to  eat. You are feeding a baby, not a clock!    ? Signs that your baby is ready to eat include:  starting to wake up, chewing on fists, moving  the face from side to side, opening and closing  the mouth, sticking out the tongue and turning  toward the breast when held. Crying is a late  sign of hunger, so look for the earlier signals  that your baby makes.    ? Feed your baby only human milk for at least  six months. The World Health Organization  recommends breastfeeding for the first year,  noting it is a prema baby who is  for  the first two years.    ? While in the hospital, plan to keep your baby  with you at all times, except for certain medical  procedures. This is an important time for you  and your baby to get to know each other and  practice breastfeeding.     Common questions    Below are questions that many women have about  breastfeeding. You will find further information in the  childbirth book your care team gave you. If you have more questions, speak with your midwife, nurse or doctor.    How do I involve my partner, family and  friends and get their help and support?    Sometimes family and friends dont understand why  you want to breastfeed. Perhaps they themselves  didnt breastfeed, or they werent  as infants. Tell them about the benefits of breastfeeding and how important it is to feed only human milk for the first six months or so. If you feed often, you will make plenty of milk to help your baby grow, fight illness and get the  best start possible.    After two to four weeks--once your baby is feeding  well and your milk supply is well established--your  partner and others can feed the baby your milk from  a cup, dropper or bottle. You can remove milk from  your breast (by hand or pump) and store it for later  use. This way, your baby will have your milk even  when youre away.    Remind everyone that there are lots of ways to help  that dont involve feeding: making meals, caring for  your other children, comforting the baby if they cries, changing diapers, running errands and more.    Is breastfeeding painful?    Not usually. There are ways to prevent pain and to  treat it if it happens.    The best ways to avoid pain are to feed your baby  often, use a good position and correctly latch your  baby onto the breast. These help prevent the two  most common sources of pain: sore nipples and  engorgement (overly full breasts). You will learn more about these topics in a breastfeeding class and in the hospital after your baby is born.         How much time does it take to breastfeed?    Some new mothers feel that all they do is breastfeed. In the early weeks, a baby eats 8 to 12 times per day. Sometimes babies will cluster feedings close together. At other times, there are longer stretches between feedings.    Remember, your newborns stomach will be about  the size of a walnut. Your baby wont eat much at each feeding. If you feed your baby often in the first days, your baby--and your milk supply--will grow. Your baby will eat more at each session, and you will need to nurse less often. Within a few weeks, most women find that breastfeeding is easier and takes less time than formula feeding.    How will I know if my baby is getting  enough milk?    Your body will start making milk as soon as your  baby is born. The more often you put your baby to  breast, the more milk you will make. You should avoid pacifiers for the first several weeks until  breastfeeding is well established--you want your baby to do all their sucking at the breast. This will help you make more milk.    There are signs that your baby is getting enough milk. You will learn these signs over time. For example:    ? You will count wet and soiled diapers, because  these show how much milk your baby is getting.    ? Your baby will seem satisfied after feedings.    ? Your baby will grow and gain weight after the first  few days.    Are there reasons why a woman shouldnt  breastfeed her baby?    There are a few medical concerns that prevent  breastfeeding. In mothers, these include being HIVpositive, having active or untreated TB (tuberculosis)  and using street drugs or some medicines. These  mothers usually choose infant formula for their  Babies.    A few women choose not to breastfeed for personal  reasons. But most mothers can breastfeed. If you are not sure if its okay to breastfeed, ask your care team.    Getting support    Before your baby is born, get as much information  as you can. Sign up for a breastfeeding class. Most  childbirth classes discuss breastfeeding, but a special class will give more in-depth information.    ? In [facility]: Go to [facility] at [employer].    ? In [facility]: Go to www.[employer].  Click on Classes-and Events at the bottom of the  page, then search for breastfeeding. Or, call [phone].    Remember, help is available from your hospital, clinic, lactation experts or online. If you need help after leaving the hospital:    ? Call your babys clinic. (If you dont have a clinic,  call [phone] and ask for a referral.)    ? Call a lactation consultant. (If you need help  finding a location that offers lactation support, call  [phone].)    ? Call [employer] [facility] (24 hours a  day) at 5-574-GITaKaDu [[phone]].    ? Call the Women, Infants and Children (WIC)  program at 1-891.323.4695.    ?  Call the National Womens Health Information  Center (English and Greenlandic) at 1-857.133.4309 or  go to www.womenshealth.gov/breastfeeding.    ? Go to [employer].     For informational purposes only. Not to replace the advice of your health care provider. Copyright   2010 [facility]. All rights reserved. Clinically reviewed by [facility]. [employer] [id number] - REV 06/18.

## 2021-06-20 NOTE — LETTER
Letter by Tyra Segura MD at      Author: Tyra Segura MD Service: -- Author Type: --    Filed:  Encounter Date: 8/10/2020 Status: (Other)         Davey Rooney  [address]  [place] [address]                 August 13, 2020       Dear Ms. Rooney,    As a [facility] patient, your healthcare needs are our priority. Our clinic records indicate we have attempted to contact you, but have not heard back from you after three (3) attempts. We tried to contact you in regards of your test results. We wanted to let you know you have a bladder infection. I sent in a prescription for You. You should check with your pharmacy. The medication I sent is Amoxicillin.     Please call with questions or contact us using [employer].      Sincerely,        Electronically signed by Tyra Segura MD

## 2021-06-20 NOTE — LETTER
Letter by April Hu PA-C at      Author: April Hu PA-C Service: -- Author Type: --    Filed:  Encounter Date: 8/5/2020 Status: (Other)         August 5, 2020     Patient: Davey Rooney   YOB: 1995   Date of Visit: 8/5/2020       To Whom It May Concern:    It is my medical opinion that Davey Rooney can only work 8 hours a day, due to a medical condition.  This is effective 8/5/20 through 10/5/20.    If you have any questions or concerns, please don't hesitate to call.    Sincerely,        Electronically signed by April Hu PA-C

## 2021-06-20 NOTE — LETTER
Letter by April Hu PA-C at      Author: April Hu PA-C Service: -- Author Type: --    Filed:  Encounter Date: 8/5/2020 Status: (Other)       08/05/20    To Whom It May Concern:    Davey Rooney was seen today at [facility] for Pregnancy Confirmation.    She is 8w0d.    Due Date: Estimated Date of Delivery: 3/17/21     Thank you.    April Hu PA-C

## 2021-06-27 ENCOUNTER — HEALTH MAINTENANCE LETTER (OUTPATIENT)
Age: 26
End: 2021-06-27

## 2021-07-14 PROBLEM — Z3A.08 8 WEEKS GESTATION OF PREGNANCY: Status: RESOLVED | Noted: 2020-08-05 | Resolved: 2020-11-11

## 2021-07-14 PROBLEM — Z30.017 NEXPLANON INSERTION: Status: RESOLVED | Noted: 2018-05-09 | Resolved: 2019-08-13

## 2021-07-14 PROBLEM — Z34.90 PREGNANT: Status: RESOLVED | Noted: 2018-03-22 | Resolved: 2018-03-22

## 2021-07-14 PROBLEM — Z34.90 PREGNANT: Status: RESOLVED | Noted: 2021-03-21 | Resolved: 2021-05-04

## 2021-07-14 PROBLEM — N10 ACUTE PYELONEPHRITIS IN THIRD TRIMESTER, ANTEPARTUM: Status: RESOLVED | Noted: 2021-03-02 | Resolved: 2021-05-04

## 2021-07-14 PROBLEM — O23.03 ACUTE PYELONEPHRITIS IN THIRD TRIMESTER, ANTEPARTUM: Status: RESOLVED | Noted: 2021-03-02 | Resolved: 2021-05-04

## 2021-07-14 PROBLEM — Z34.90 NORMAL PREGNANCY: Status: RESOLVED | Noted: 2017-08-21 | Resolved: 2018-03-22

## 2021-07-14 PROBLEM — Z34.80 SUPERVISION OF OTHER NORMAL PREGNANCY, ANTEPARTUM: Status: RESOLVED | Noted: 2020-09-21 | Resolved: 2021-05-04

## 2021-07-26 ENCOUNTER — MEDICAL CORRESPONDENCE (OUTPATIENT)
Dept: HEALTH INFORMATION MANAGEMENT | Facility: CLINIC | Age: 26
End: 2021-07-26

## 2021-09-27 ENCOUNTER — MEDICAL CORRESPONDENCE (OUTPATIENT)
Dept: HEALTH INFORMATION MANAGEMENT | Facility: CLINIC | Age: 26
End: 2021-09-27

## 2021-10-17 ENCOUNTER — HEALTH MAINTENANCE LETTER (OUTPATIENT)
Age: 26
End: 2021-10-17

## 2022-04-14 ENCOUNTER — PRENATAL OFFICE VISIT (OUTPATIENT)
Dept: FAMILY MEDICINE | Facility: CLINIC | Age: 27
End: 2022-04-14
Payer: COMMERCIAL

## 2022-04-14 VITALS
HEIGHT: 59 IN | BODY MASS INDEX: 35.88 KG/M2 | RESPIRATION RATE: 20 BRPM | DIASTOLIC BLOOD PRESSURE: 68 MMHG | HEART RATE: 77 BPM | WEIGHT: 178 LBS | SYSTOLIC BLOOD PRESSURE: 107 MMHG

## 2022-04-14 DIAGNOSIS — N91.2 AMENORRHEA: ICD-10-CM

## 2022-04-14 DIAGNOSIS — Z3A.14 14 WEEKS GESTATION OF PREGNANCY: Primary | ICD-10-CM

## 2022-04-14 LAB
ABO/RH(D): NORMAL
ALBUMIN UR-MCNC: NEGATIVE MG/DL
ANTIBODY SCREEN: NEGATIVE
BASOPHILS # BLD AUTO: 0 10E3/UL (ref 0–0.2)
BASOPHILS NFR BLD AUTO: 0 %
EOSINOPHIL # BLD AUTO: 0.2 10E3/UL (ref 0–0.7)
EOSINOPHIL NFR BLD AUTO: 3 %
ERYTHROCYTE [DISTWIDTH] IN BLOOD BY AUTOMATED COUNT: 12.9 % (ref 10–15)
GLUCOSE UR STRIP-MCNC: NEGATIVE MG/DL
HBA1C MFR BLD: 5.4 % (ref 0–5.6)
HCG UR QL: POSITIVE
HCT VFR BLD AUTO: 36.1 % (ref 35–47)
HGB BLD-MCNC: 12.4 G/DL (ref 11.7–15.7)
HIV 1+2 AB+HIV1 P24 AG SERPL QL IA: NEGATIVE
IMM GRANULOCYTES # BLD: 0 10E3/UL
IMM GRANULOCYTES NFR BLD: 0 %
KETONES UR STRIP-MCNC: NEGATIVE MG/DL
LYMPHOCYTES # BLD AUTO: 1.9 10E3/UL (ref 0.8–5.3)
LYMPHOCYTES NFR BLD AUTO: 22 %
MCH RBC QN AUTO: 28.2 PG (ref 26.5–33)
MCHC RBC AUTO-ENTMCNC: 34.3 G/DL (ref 31.5–36.5)
MCV RBC AUTO: 82 FL (ref 78–100)
MONOCYTES # BLD AUTO: 0.6 10E3/UL (ref 0–1.3)
MONOCYTES NFR BLD AUTO: 7 %
NEUTROPHILS # BLD AUTO: 5.7 10E3/UL (ref 1.6–8.3)
NEUTROPHILS NFR BLD AUTO: 68 %
PLATELET # BLD AUTO: 206 10E3/UL (ref 150–450)
RBC # BLD AUTO: 4.4 10E6/UL (ref 3.8–5.2)
SPECIMEN EXPIRATION DATE: NORMAL
WBC # BLD AUTO: 8.4 10E3/UL (ref 4–11)

## 2022-04-14 PROCEDURE — 81025 URINE PREGNANCY TEST: CPT | Performed by: PHYSICIAN ASSISTANT

## 2022-04-14 PROCEDURE — 86762 RUBELLA ANTIBODY: CPT | Performed by: PHYSICIAN ASSISTANT

## 2022-04-14 PROCEDURE — 87340 HEPATITIS B SURFACE AG IA: CPT | Performed by: PHYSICIAN ASSISTANT

## 2022-04-14 PROCEDURE — 87086 URINE CULTURE/COLONY COUNT: CPT | Performed by: PHYSICIAN ASSISTANT

## 2022-04-14 PROCEDURE — 86780 TREPONEMA PALLIDUM: CPT | Performed by: PHYSICIAN ASSISTANT

## 2022-04-14 PROCEDURE — 87389 HIV-1 AG W/HIV-1&-2 AB AG IA: CPT | Performed by: PHYSICIAN ASSISTANT

## 2022-04-14 PROCEDURE — 81003 URINALYSIS AUTO W/O SCOPE: CPT | Performed by: PHYSICIAN ASSISTANT

## 2022-04-14 PROCEDURE — 83655 ASSAY OF LEAD: CPT | Mod: 90 | Performed by: PHYSICIAN ASSISTANT

## 2022-04-14 PROCEDURE — 36415 COLL VENOUS BLD VENIPUNCTURE: CPT | Performed by: PHYSICIAN ASSISTANT

## 2022-04-14 PROCEDURE — 85025 COMPLETE CBC W/AUTO DIFF WBC: CPT | Performed by: PHYSICIAN ASSISTANT

## 2022-04-14 PROCEDURE — 86900 BLOOD TYPING SEROLOGIC ABO: CPT | Performed by: PHYSICIAN ASSISTANT

## 2022-04-14 PROCEDURE — 86803 HEPATITIS C AB TEST: CPT | Performed by: PHYSICIAN ASSISTANT

## 2022-04-14 PROCEDURE — 99213 OFFICE O/P EST LOW 20 MIN: CPT | Performed by: PHYSICIAN ASSISTANT

## 2022-04-14 PROCEDURE — 86901 BLOOD TYPING SEROLOGIC RH(D): CPT | Performed by: PHYSICIAN ASSISTANT

## 2022-04-14 PROCEDURE — 99000 SPECIMEN HANDLING OFFICE-LAB: CPT | Performed by: PHYSICIAN ASSISTANT

## 2022-04-14 PROCEDURE — 83036 HEMOGLOBIN GLYCOSYLATED A1C: CPT | Performed by: PHYSICIAN ASSISTANT

## 2022-04-14 PROCEDURE — 86850 RBC ANTIBODY SCREEN: CPT | Performed by: PHYSICIAN ASSISTANT

## 2022-04-14 RX ORDER — PNV NO.95/FERROUS FUM/FOLIC AC 28MG-0.8MG
1 TABLET ORAL DAILY
Qty: 100 CAPSULE | Refills: 3 | Status: SHIPPED | OUTPATIENT
Start: 2022-04-14 | End: 2022-11-29

## 2022-04-14 NOTE — PATIENT INSTRUCTIONS
Pregnancy: 14 weeks    Due Date: October 11, 2022    Next visit in 4 weeks  With Dr. Bass  For Your First OB Visit          Someone should be calling you within 2-3 days to schedule your ultrasound appointment.    You can also call them to schedule it yourself if you prefer.  If you would like to schedule your ultrasound yourself, call #[phone].

## 2022-04-14 NOTE — LETTER
04/14/22          To Whom It May Concern:      Davey Rooney (1995) is pregnant.  Estimated Date of Delivery: Oct 11, 2022        Sincerely,    April Hu PA-C

## 2022-04-15 LAB
HBV SURFACE AG SERPL QL IA: NONREACTIVE
HCV AB SERPL QL IA: NEGATIVE
RUBV IGG SERPL QL IA: 0.94 INDEX
RUBV IGG SERPL QL IA: NORMAL
T PALLIDUM AB SER QL: NONREACTIVE

## 2022-04-17 LAB — BACTERIA UR CULT: NORMAL

## 2022-04-18 LAB — LEAD BLDV-MCNC: <2 UG/DL

## 2022-04-21 NOTE — PROGRESS NOTES
"ASSESSMENT and PLAN:  1. Pregnancy Intake Appointment  Davey Rooney is 26 year old and .  Patient's last menstrual period was 2022.  Estimated Date of Delivery: Oct 11, 2022  She will be seeing Dr. Bass for OB care at [facility].  Screening pregnancy lab work was drawn.  Prenatal vitamin prescribed.  Problem list and current medications reviewed and updated.  Dating ultrasound ordered.  Potential exposures/risks discussed: work environment, raw meat/seafood/deli meat, home construction, cats, caffeine.  She declines COVID booster, wants to get it at the same time as her .  Follow up in 4 weeks.        HPI:  Davey Rooney is a 26 year old female here for pregnancy intake.  She is .  Patient's last menstrual period was 2022.      Past Medical History:   Diagnosis Date     Acute pyelonephritis in third trimester, antepartum 2021     Asymptomatic bacteriuria during pregnancy 10/27/2015     Chorioamnionitis, delivered, current hospitalization 2016     Miscarriage 2014     Miscarriage 2015     Urinary tract infection      Vacuum extractor delivery, delivered 2016        Past Surgical History:   Procedure Laterality Date     WISDOM TOOTH EXTRACTION          Current Outpatient Medications   Medication     Prenatal MV-Min-Fe Fum-FA-DHA (PRENATAL MULTIVITAMIN PLUS DHA) 27-0.8-250 MG CAPS     No current facility-administered medications for this visit.          OBJECTIVE:  No Known Allergies  /68 (BP Location: Left arm, Patient Position: Sitting, Cuff Size: Adult Regular)   Pulse 77   Resp 20   Ht 1.51 m (4' 11.45\")   Wt 80.7 kg (178 lb)   LMP 2022   BMI 35.41 kg/m     Body mass index is 35.41 kg/m .     Vital signs stable as recorded above.  General: Patient is alert and oriented x 3, in no apparent distress  Fetal Exam: FHR heard at 150 bpm, fundal height not palpable    Prenatal Office Visit on 2022   Component Date Value Ref Range " Status     hCG Urine Qualitative 04/14/2022 Positive (A) Negative Final     Culture 04/14/2022 10,000-50,000 CFU/mL Mixture of urogenital ayesha   Final     Lead Venous Blood 04/14/2022 <2.0  <=4.9 ug/dL Final     Hepatitis B Surface Antigen 04/14/2022 Nonreactive  Nonreactive Final     HIV Antigen Antibody Combo 04/14/2022 Negative  Negative Final     Rubella Zakiya IgG Instrument Value 04/14/2022 0.94  <0.90 Index Final     Rubella Antibody IgG 04/14/2022 Equivocal, please recollect.   Final     Treponema Antibody Total 04/14/2022 Nonreactive  Nonreactive Final     Hemoglobin A1C 04/14/2022 5.4  0.0 - 5.6 % Final     Hepatitis C Antibody 04/14/2022 Negative  Negative Final     ABO/RH(D) 04/14/2022 O POS   Final     Antibody Screen 04/14/2022 Negative  Negative Final     SPECIMEN EXPIRATION DATE 04/14/2022 [id number]   Final     WBC Count 04/14/2022 8.4  4.0 - 11.0 10e3/uL Final     RBC Count 04/14/2022 4.40  3.80 - 5.20 10e6/uL Final     Hemoglobin 04/14/2022 12.4  11.7 - 15.7 g/dL Final     Hematocrit 04/14/2022 36.1  35.0 - 47.0 % Final     MCV 04/14/2022 82  78 - 100 fL Final     MCH 04/14/2022 28.2  26.5 - 33.0 pg Final     MCHC 04/14/2022 34.3  31.5 - 36.5 g/dL Final     RDW 04/14/2022 12.9  10.0 - 15.0 % Final     Platelet Count 04/14/2022 206  150 - 450 10e3/uL Final     % Neutrophils 04/14/2022 68  % Final     % Lymphocytes 04/14/2022 22  % Final     % Monocytes 04/14/2022 7  % Final     % Eosinophils 04/14/2022 3  % Final     % Basophils 04/14/2022 0  % Final     % Immature Granulocytes 04/14/2022 0  % Final     Absolute Neutrophils 04/14/2022 5.7  1.6 - 8.3 10e3/uL Final     Absolute Lymphocytes 04/14/2022 1.9  0.8 - 5.3 10e3/uL Final     Absolute Monocytes 04/14/2022 0.6  0.0 - 1.3 10e3/uL Final     Absolute Eosinophils 04/14/2022 0.2  0.0 - 0.7 10e3/uL Final     Absolute Basophils 04/14/2022 0.0  0.0 - 0.2 10e3/uL Final     Absolute Immature Granulocytes 04/14/2022 0.0  <=0.4 10e3/uL Final     Glucose  Urine 04/14/2022 Negative  Negative mg/dL Final     Ketones Urine 04/14/2022 Negative  Negative mg/dL Final     Protein Albumin Urine 04/14/2022 Negative  Negative mg/dL Final          Other screening pregnancy lab work is pending.      Please see OB Episode for complete details.    This dictation uses voice recognition software, which may contain typographical errors.

## 2022-05-11 ENCOUNTER — HOSPITAL ENCOUNTER (OUTPATIENT)
Dept: ULTRASOUND IMAGING | Facility: HOSPITAL | Age: 27
Discharge: HOME | End: 2022-05-11
Attending: PHYSICIAN ASSISTANT | Admitting: PHYSICIAN ASSISTANT
Payer: COMMERCIAL

## 2022-05-11 DIAGNOSIS — Z3A.14 14 WEEKS GESTATION OF PREGNANCY: ICD-10-CM

## 2022-05-11 PROCEDURE — 76805 OB US >/= 14 WKS SNGL FETUS: CPT

## 2022-05-12 ENCOUNTER — PRENATAL OFFICE VISIT (OUTPATIENT)
Dept: FAMILY MEDICINE | Facility: CLINIC | Age: 27
End: 2022-05-12
Payer: COMMERCIAL

## 2022-05-12 VITALS
RESPIRATION RATE: 20 BRPM | HEART RATE: 84 BPM | WEIGHT: 180 LBS | DIASTOLIC BLOOD PRESSURE: 66 MMHG | BODY MASS INDEX: 35.81 KG/M2 | SYSTOLIC BLOOD PRESSURE: 104 MMHG

## 2022-05-12 DIAGNOSIS — Z34.82 ENCOUNTER FOR SUPERVISION OF OTHER NORMAL PREGNANCY IN SECOND TRIMESTER: Primary | ICD-10-CM

## 2022-05-12 PROBLEM — Z3A.14 14 WEEKS GESTATION OF PREGNANCY: Status: RESOLVED | Noted: 2022-04-14 | Resolved: 2022-05-12

## 2022-05-12 PROCEDURE — 81003 URINALYSIS AUTO W/O SCOPE: CPT | Performed by: FAMILY MEDICINE

## 2022-05-12 PROCEDURE — 99207 PR PRENATAL VISIT: CPT | Performed by: FAMILY MEDICINE

## 2022-05-12 NOTE — PROGRESS NOTES
Subjective:  26 year old  at 18w2d  No ctx, lof, bleeding, or dc.  No HA or vision changes.  Nausea improved.    Outpatient Medications Prior to Visit   Medication Sig Dispense Refill     Prenatal MV-Min-Fe Fum-FA-DHA (PRENATAL MULTIVITAMIN PLUS DHA) 27-0.8-250 MG CAPS Take 1 tablet by mouth daily OK to substitute formulary equivalent 100 capsule 3     No facility-administered medications prior to visit.      History   Smoking Status     Never Smoker   Smokeless Tobacco     Never Used      Objective:  /66 (BP Location: Right arm, Patient Position: Sitting, Cuff Size: Adult Regular)   Pulse 84   Resp 20   Wt 81.6 kg (180 lb)   LMP 2022   BMI 35.81 kg/m    GENERAL: alert, not distressed  CHEST: clear, no rales, rhonchi, or wheezes  CARDIAC: regular without murmur, gallop, or rub  ABDOMEN: gravid, soft, non tender, non distended    Recent Results (from the past 24 hour(s))   Urinalysis, OB Screen    Collection Time: 22 12:41 PM   Result Value Ref Range    Glucose Urine Negative Negative mg/dL    Ketones Urine Negative Negative mg/dL    Protein Albumin Urine Negative Negative mg/dL      Assessment and Plan:   1. Encounter for supervision of other normal pregnancy in second trimester  Encouraged exercise.  Will have her return to complete the fetal survey ultrasound.       Return in 1 month (on 2022) for prenatal care.   Future Appointments   Date Time Provider Department Center   2022  2:00 PM Jv Bass MD [facility]

## 2022-06-16 ENCOUNTER — PRENATAL OFFICE VISIT (OUTPATIENT)
Dept: FAMILY MEDICINE | Facility: CLINIC | Age: 27
End: 2022-06-16
Payer: COMMERCIAL

## 2022-06-16 VITALS
SYSTOLIC BLOOD PRESSURE: 102 MMHG | WEIGHT: 185 LBS | BODY MASS INDEX: 36.8 KG/M2 | HEART RATE: 91 BPM | RESPIRATION RATE: 20 BRPM | DIASTOLIC BLOOD PRESSURE: 68 MMHG

## 2022-06-16 DIAGNOSIS — N89.8 VAGINAL DISCHARGE: ICD-10-CM

## 2022-06-16 DIAGNOSIS — Z34.82 ENCOUNTER FOR SUPERVISION OF OTHER NORMAL PREGNANCY IN SECOND TRIMESTER: Primary | ICD-10-CM

## 2022-06-16 DIAGNOSIS — R31.9 HEMATURIA, UNSPECIFIED TYPE: ICD-10-CM

## 2022-06-16 LAB
ALBUMIN UR-MCNC: NEGATIVE MG/DL
ALBUMIN UR-MCNC: NEGATIVE MG/DL
APPEARANCE UR: CLEAR
BACTERIA #/AREA URNS HPF: ABNORMAL /HPF
BILIRUB UR QL STRIP: NEGATIVE
CLUE CELLS: ABNORMAL
COLOR UR AUTO: YELLOW
GLUCOSE UR STRIP-MCNC: NEGATIVE MG/DL
GLUCOSE UR STRIP-MCNC: NEGATIVE MG/DL
HGB UR QL STRIP: ABNORMAL
KETONES UR STRIP-MCNC: NEGATIVE MG/DL
KETONES UR STRIP-MCNC: NEGATIVE MG/DL
LEUKOCYTE ESTERASE UR QL STRIP: ABNORMAL
NITRATE UR QL: NEGATIVE
PH UR STRIP: 6.5 [PH] (ref 5–8)
RBC #/AREA URNS AUTO: ABNORMAL /HPF
SP GR UR STRIP: <=1.005 (ref 1–1.03)
SQUAMOUS #/AREA URNS AUTO: ABNORMAL /LPF
TRICHOMONAS, WET PREP: ABNORMAL
UROBILINOGEN UR STRIP-ACNC: 0.2 E.U./DL
WBC #/AREA URNS AUTO: ABNORMAL /HPF
WBC'S/HIGH POWER FIELD, WET PREP: ABNORMAL
YEAST, WET PREP: ABNORMAL

## 2022-06-16 PROCEDURE — 99207 PR PRENATAL VISIT: CPT | Performed by: FAMILY MEDICINE

## 2022-06-16 PROCEDURE — 87086 URINE CULTURE/COLONY COUNT: CPT | Performed by: FAMILY MEDICINE

## 2022-06-16 PROCEDURE — 87210 SMEAR WET MOUNT SALINE/INK: CPT | Performed by: FAMILY MEDICINE

## 2022-06-16 PROCEDURE — 81003 URINALYSIS AUTO W/O SCOPE: CPT | Performed by: FAMILY MEDICINE

## 2022-06-16 PROCEDURE — 99213 OFFICE O/P EST LOW 20 MIN: CPT | Performed by: FAMILY MEDICINE

## 2022-06-16 NOTE — PROGRESS NOTES
Subjective:  26 year old  at 23w2d  No ctx, lof, bleeding.  No HA or vision changes.  Question of blood in urine.  Wiped and noticed some pink two weeks ago.  The next day saw pink in toilet after urinating.  No ctx.   No dysuria or increased frequency.  Discharge is increased.  No significant itching or odor.    Hx of pyelo with previous pregnancy  No fever.  No back pain.  No flank pain.    Outpatient Medications Prior to Visit   Medication Sig Dispense Refill     Prenatal MV-Min-Fe Fum-FA-DHA (PRENATAL MULTIVITAMIN PLUS DHA) 27-0.8-250 MG CAPS Take 1 tablet by mouth daily OK to substitute formulary equivalent 100 capsule 3     No facility-administered medications prior to visit.      History   Smoking Status     Never Smoker   Smokeless Tobacco     Never Used      Objective:  /68 (BP Location: Left arm, Patient Position: Sitting, Cuff Size: Adult Regular)   Pulse 91   Resp 20   Wt 83.9 kg (185 lb)   LMP 2022   BMI 36.80 kg/m    GENERAL: alert, not distressed  CHEST: clear, no rales, rhonchi, or wheezes  CARDIAC: regular without murmur, gallop, or rub  ABDOMEN: gravid, soft, non tender, non distended  : cervix closed on SSE, mild discharge, no fluid, no bleeding    Recent Results (from the past 24 hour(s))   Urinalysis, OB Screen    Collection Time: 22  2:04 PM   Result Value Ref Range    Glucose Urine Negative Negative mg/dL    Ketones Urine Negative Negative mg/dL    Protein Albumin Urine Negative Negative mg/dL   UA with Microscopic - lab collect    Collection Time: 22  2:32 PM   Result Value Ref Range    Color Urine Yellow Colorless, Straw, Light Yellow, Yellow    Appearance Urine Clear Clear    Glucose Urine Negative Negative mg/dL    Bilirubin Urine Negative Negative    Ketones Urine Negative Negative mg/dL    Specific Gravity Urine <=1.005 1.005 - 1.030    Blood Urine Trace (A) Negative    pH Urine 6.5 5.0 - 8.0    Protein Albumin Urine Negative Negative mg/dL     Urobilinogen Urine 0.2 0.2, 1.0 E.U./dL    Nitrite Urine Negative Negative    Leukocyte Esterase Urine Moderate (A) Negative   Urine Microscopic Exam    Collection Time: 06/16/22  2:32 PM   Result Value Ref Range    Bacteria Urine Moderate (A) None Seen /HPF    RBC Urine 0-2 0-2 /HPF /HPF    WBC Urine 5-10 (A) 0-5 /HPF /HPF    Squamous Epithelials Urine Moderate (A) None Seen /LPF   Wet prep - lab collect    Collection Time: 06/16/22  3:05 PM    Specimen: Vagina; Swab   Result Value Ref Range    Trichomonas Absent Absent    Yeast Absent Absent    Clue Cells Absent Absent    WBCs/high power field 4+ (A) None   Urine Culture Aerobic Bacterial - lab collect    Collection Time: 06/16/22  3:22 PM    Specimen: Urine, Midstream   Result Value Ref Range    Culture Culture in progress       Assessment and Plan:   Davey was seen today for prenatal care.    Diagnoses and all orders for this visit:    Encounter for supervision of normal pregnancy in second trimester  Has ultrasound scheduled for tomorrow.    Hematuria, unspecified type  Minimal on exam.  Evaluate with urine culture.  -     UA with Microscopic - lab collect; Future  -     Urine Microscopic Exam  -     Urine Culture Aerobic Bacterial - lab collect; Future  -     Urine Culture Aerobic Bacterial - lab collect; Future    Vaginal discharge  No pathogens on wet prep.  Low risk for CT.  Mostly likely physiologic.  Monitor for now.  Consider yeast treatment if increases, or persistent.  -     Wet prep - lab collect; Future         Return in 1 month (on 7/16/2022) for prenatal care.   Future Appointments   Date Time Provider Department Center   6/17/2022  1:00 PM [facility] US 1 [facility]   7/14/2022  2:00 PM Jv Bass MD [facility]

## 2022-06-17 ENCOUNTER — HOSPITAL ENCOUNTER (OUTPATIENT)
Dept: ULTRASOUND IMAGING | Facility: HOSPITAL | Age: 27
Discharge: HOME | End: 2022-06-17
Attending: FAMILY MEDICINE | Admitting: FAMILY MEDICINE
Payer: COMMERCIAL

## 2022-06-17 DIAGNOSIS — Z34.82 ENCOUNTER FOR SUPERVISION OF OTHER NORMAL PREGNANCY IN SECOND TRIMESTER: ICD-10-CM

## 2022-06-17 PROCEDURE — 76815 OB US LIMITED FETUS(S): CPT

## 2022-06-18 LAB — BACTERIA UR CULT: NORMAL

## 2022-07-14 ENCOUNTER — PRENATAL OFFICE VISIT (OUTPATIENT)
Dept: FAMILY MEDICINE | Facility: CLINIC | Age: 27
End: 2022-07-14
Payer: COMMERCIAL

## 2022-07-14 VITALS
WEIGHT: 188 LBS | HEART RATE: 101 BPM | BODY MASS INDEX: 37.4 KG/M2 | RESPIRATION RATE: 21 BRPM | SYSTOLIC BLOOD PRESSURE: 95 MMHG | DIASTOLIC BLOOD PRESSURE: 63 MMHG

## 2022-07-14 DIAGNOSIS — O99.013 ANEMIA IN PREGNANCY, THIRD TRIMESTER: ICD-10-CM

## 2022-07-14 DIAGNOSIS — Z34.82 ENCOUNTER FOR SUPERVISION OF OTHER NORMAL PREGNANCY IN SECOND TRIMESTER: Primary | ICD-10-CM

## 2022-07-14 LAB
ALBUMIN UR-MCNC: NEGATIVE MG/DL
GLUCOSE 1H P 50 G GLC PO SERPL-MCNC: 135 MG/DL (ref 70–129)
GLUCOSE UR STRIP-MCNC: NEGATIVE MG/DL
HGB BLD-MCNC: 10.8 G/DL (ref 11.7–15.7)
KETONES UR STRIP-MCNC: NEGATIVE MG/DL

## 2022-07-14 PROCEDURE — 90715 TDAP VACCINE 7 YRS/> IM: CPT | Performed by: FAMILY MEDICINE

## 2022-07-14 PROCEDURE — 90471 IMMUNIZATION ADMIN: CPT | Performed by: FAMILY MEDICINE

## 2022-07-14 PROCEDURE — 81003 URINALYSIS AUTO W/O SCOPE: CPT | Performed by: FAMILY MEDICINE

## 2022-07-14 PROCEDURE — 82950 GLUCOSE TEST: CPT | Performed by: FAMILY MEDICINE

## 2022-07-14 PROCEDURE — 99207 PR PRENATAL VISIT: CPT | Performed by: FAMILY MEDICINE

## 2022-07-14 PROCEDURE — 86780 TREPONEMA PALLIDUM: CPT | Performed by: FAMILY MEDICINE

## 2022-07-14 PROCEDURE — 36415 COLL VENOUS BLD VENIPUNCTURE: CPT | Performed by: FAMILY MEDICINE

## 2022-07-15 ENCOUNTER — TELEPHONE (OUTPATIENT)
Dept: FAMILY MEDICINE | Facility: CLINIC | Age: 27
End: 2022-07-15

## 2022-07-15 LAB — T PALLIDUM AB SER QL: NONREACTIVE

## 2022-07-15 RX ORDER — FERROUS SULFATE 325(65) MG
325 TABLET ORAL
Qty: 90 TABLET | Refills: 1 | Status: SHIPPED | OUTPATIENT
Start: 2022-07-15 | End: 2022-08-11

## 2022-07-15 NOTE — TELEPHONE ENCOUNTER
----- Message from Jv Bass MD sent at 7/15/2022  8:48 AM CDT -----  Please call patient:  Glucose test result was too high.  She needs to do a fasting 3 hour test.  Should be done in a week or two.    Secondly, blood levels are a little low.  Eat high iron foods.  Take and iron supplement.  I will send a prescription to the pharmacy.

## 2022-07-17 NOTE — PROGRESS NOTES
Subjective:  26 year old  at 27 w 2d  No ctx, lof, bleeding, or dc.  No HA or vision changes.  Lower abdominal achiness consistent with round ligament pain.    Outpatient Medications Prior to Visit   Medication Sig Dispense Refill     Prenatal MV-Min-Fe Fum-FA-DHA (PRENATAL MULTIVITAMIN PLUS DHA) 27-0.8-250 MG CAPS Take 1 tablet by mouth daily OK to substitute formulary equivalent 100 capsule 3     No facility-administered medications prior to visit.      History   Smoking Status     Never Smoker   Smokeless Tobacco     Never Used      Objective:  BP 95/63 (BP Location: Left arm, Patient Position: Sitting, Cuff Size: Adult Large)   Pulse 101   Resp 21   Wt 85.3 kg (188 lb)   LMP 2022   BMI 37.40 kg/m    GENERAL: alert, not distressed  CHEST: clear, no rales, rhonchi, or wheezes  CARDIAC: regular without murmur, gallop, or rub  ABDOMEN: gravid, soft, non tender, non distended    Recent Results (from the past 240 hour(s))   Urinalysis, OB Screen    Collection Time: 22  2:09 PM   Result Value Ref Range    Glucose Urine Negative Negative mg/dL    Ketones Urine Negative Negative mg/dL    Protein Albumin Urine Negative Negative mg/dL   OB Hemoglobin    Collection Time: 22  3:07 PM   Result Value Ref Range    Hemoglobin 10.8 (L) 11.7 - 15.7 g/dL   Glucose Challenge Test (GCT) 1 Hour    Collection Time: 22  3:07 PM   Result Value Ref Range    Glu Gest Screen 1hr 50g 135 (H) 70 - 129 mg/dL   Treponema Abs w Reflex to RPR and Titer    Collection Time: 22  3:07 PM   Result Value Ref Range    Treponema Antibody Total Nonreactive Nonreactive       Assessment and Plan:   1. Encounter for supervision of other normal pregnancy in second trimester  Will need to return for 3-hour fasting test.  - TDAP VACCINE (Adacel, Boostrix)  [[id number]]  - Glucose tolerance, gest std 100 gm, 3 hr; Future    2. Anemia in pregnancy, third trimester  We will start oral iron supplementation for mildly low  hemoglobin.  - ferrous sulfate (FEROSUL) 325 (65 Fe) MG tablet; Take 1 tablet (325 mg) by mouth daily (with breakfast)  Dispense: 90 tablet; Refill: 1     She declined COVID-vaccine which was recommended.    Return in 1 month (on 8/14/2022) for prenatal care.   Future Appointments   Date Time Provider Department Center   7[facility]21[facility]2022 10[facility]   [facility]11[facility]2022 12[facility]   9/8/2022 12:40 PM [facility]   9/15/2022 12:40 PM Jv Bass MD [facility]   9/22/2022 12:20 PM [facility]   9/29/2022 12:[facility]   10/6/2022 12:40 [facility]   10/13/2022 12:40 PM [facility]

## 2022-07-21 ENCOUNTER — LAB (OUTPATIENT)
Dept: LAB | Facility: CLINIC | Age: 27
End: 2022-07-21
Payer: COMMERCIAL

## 2022-07-21 DIAGNOSIS — Z34.82 ENCOUNTER FOR SUPERVISION OF OTHER NORMAL PREGNANCY IN SECOND TRIMESTER: ICD-10-CM

## 2022-07-21 PROCEDURE — 82952 GTT-ADDED SAMPLES: CPT

## 2022-07-21 PROCEDURE — 36415 COLL VENOUS BLD VENIPUNCTURE: CPT

## 2022-07-21 PROCEDURE — 82951 GLUCOSE TOLERANCE TEST (GTT): CPT

## 2022-07-22 LAB
GESTATIONAL GTT 1 HR POST DOSE: 175 MG/DL (ref 60–179)
GESTATIONAL GTT 2 HR POST DOSE: 143 MG/DL (ref 60–154)
GESTATIONAL GTT 3 HR POST DOSE: 139 MG/DL (ref 60–139)
GLUCOSE P FAST SERPL-MCNC: 69 MG/DL (ref 60–94)

## 2022-07-24 ENCOUNTER — HEALTH MAINTENANCE LETTER (OUTPATIENT)
Age: 27
End: 2022-07-24

## 2022-08-11 ENCOUNTER — PRENATAL OFFICE VISIT (OUTPATIENT)
Dept: FAMILY MEDICINE | Facility: CLINIC | Age: 27
End: 2022-08-11
Payer: COMMERCIAL

## 2022-08-11 VITALS
SYSTOLIC BLOOD PRESSURE: 110 MMHG | DIASTOLIC BLOOD PRESSURE: 69 MMHG | BODY MASS INDEX: 36.41 KG/M2 | WEIGHT: 183 LBS | RESPIRATION RATE: 16 BRPM | TEMPERATURE: 97.5 F | HEART RATE: 83 BPM

## 2022-08-11 DIAGNOSIS — O99.013 ANEMIA IN PREGNANCY, THIRD TRIMESTER: ICD-10-CM

## 2022-08-11 DIAGNOSIS — Z34.83 ENCOUNTER FOR SUPERVISION OF OTHER NORMAL PREGNANCY IN THIRD TRIMESTER: Primary | ICD-10-CM

## 2022-08-11 PROCEDURE — 99207 PR PRENATAL VISIT: CPT | Performed by: FAMILY MEDICINE

## 2022-08-11 PROCEDURE — 81003 URINALYSIS AUTO W/O SCOPE: CPT | Performed by: FAMILY MEDICINE

## 2022-08-11 RX ORDER — FERROUS GLUCONATE 324(38)MG
324 TABLET ORAL
Qty: 90 TABLET | Refills: 0 | Status: SHIPPED | OUTPATIENT
Start: 2022-08-11 | End: 2022-11-29

## 2022-08-11 NOTE — PROGRESS NOTES
Subjective:  26 year old  at 31w2d  No ctx, lof, bleeding, or dc.  No HA or vision changes.  Baby feels low.  Some RUQ pain off and on.  Worse with eating.  Has been trying to eat more red meat due to iron.  Cannot tolerate FeSO4.  Known gall stones from ultrasound during first pregnancy     Outpatient Medications Prior to Visit   Medication Sig Dispense Refill     Prenatal MV-Min-Fe Fum-FA-DHA (PRENATAL MULTIVITAMIN PLUS DHA) 27-0.8-250 MG CAPS Take 1 tablet by mouth daily OK to substitute formulary equivalent 100 capsule 3     ferrous sulfate (FEROSUL) 325 (65 Fe) MG tablet Take 1 tablet (325 mg) by mouth daily (with breakfast) 90 tablet 1     No facility-administered medications prior to visit.      History   Smoking Status     Never Smoker   Smokeless Tobacco     Never Used      Objective:  /69 (BP Location: Right arm, Patient Position: Sitting, Cuff Size: Adult Regular)   Pulse 83   Temp 97.5  F (36.4  C) (Temporal)   Resp 16   Wt 83 kg (183 lb)   LMP 2022   BMI 36.41 kg/m    GENERAL: alert, not distressed  CHEST: clear, no rales, rhonchi, or wheezes  CARDIAC: regular without murmur, gallop, or rub  ABDOMEN: gravid, soft, non tender, non distended, non tender at [place]    Recent Results (from the past 24 hour(s))   Urinalysis, OB Screen    Collection Time: 22 12:47 PM   Result Value Ref Range    Glucose Urine Negative Negative mg/dL    Ketones Urine Negative Negative mg/dL    Protein Albumin Urine Negative Negative mg/dL      Assessment and Plan:   1. Encounter for supervision of other normal pregnancy in third trimester  - Urinalysis, OB Screen    2. Anemia in pregnancy, third trimester  Change to ferrous gluconate to see if she tolerates this better.  - ferrous gluconate (FERGON) 324 (38 Fe) MG tablet; Take 1 tablet (324 mg) by mouth daily (with breakfast)  Dispense: 90 tablet; Refill: 0     3. Known gall stones.  Low fat diet.  Iron from greens, cereals.  If pain becomes  constant, call us right away.  Discussed checking LFTs today.  Patient declined.    Return in 1 month (on 9/11/2022) for prenatal care.   Future Appointments   Date Time Provider Department Center   9/8/2022 12:40 PM Jv Bass MD [facility]   9/15/2022 12:40 PM Jv Bass MD [facility]   9/22/2022 12:20 PM Jv Bass MD [facility]   9/29/2022 12:40 PM Jv Bass MD [facility]   10/6/2022 12:40 PM Jv Bass MD [facility]   10/13/2022 12:40 PM Jv Bass MD [facility]

## 2022-09-08 ENCOUNTER — PRENATAL OFFICE VISIT (OUTPATIENT)
Dept: FAMILY MEDICINE | Facility: CLINIC | Age: 27
End: 2022-09-08
Payer: COMMERCIAL

## 2022-09-08 VITALS
WEIGHT: 188 LBS | BODY MASS INDEX: 37.4 KG/M2 | SYSTOLIC BLOOD PRESSURE: 121 MMHG | DIASTOLIC BLOOD PRESSURE: 72 MMHG | HEART RATE: 109 BPM | RESPIRATION RATE: 24 BRPM

## 2022-09-08 DIAGNOSIS — Z34.83 ENCOUNTER FOR SUPERVISION OF OTHER NORMAL PREGNANCY IN THIRD TRIMESTER: Primary | ICD-10-CM

## 2022-09-08 LAB
ALBUMIN UR-MCNC: NEGATIVE MG/DL
GLUCOSE UR STRIP-MCNC: NEGATIVE MG/DL
KETONES UR STRIP-MCNC: ABNORMAL MG/DL

## 2022-09-08 PROCEDURE — 99207 PR PRENATAL VISIT: CPT | Performed by: FAMILY MEDICINE

## 2022-09-08 PROCEDURE — 87653 STREP B DNA AMP PROBE: CPT | Performed by: FAMILY MEDICINE

## 2022-09-08 PROCEDURE — 81003 URINALYSIS AUTO W/O SCOPE: CPT | Performed by: FAMILY MEDICINE

## 2022-09-09 LAB — GP B STREP DNA SPEC QL NAA+PROBE: NEGATIVE

## 2022-09-09 NOTE — PROGRESS NOTES
Subjective:  26 year old  at 35w3d  No ctx, lof, bleeding, or dc.  No HA or vision changes.    Outpatient Medications Prior to Visit   Medication Sig Dispense Refill     ferrous gluconate (FERGON) 324 (38 Fe) MG tablet Take 1 tablet (324 mg) by mouth daily (with breakfast) 90 tablet 0     Prenatal MV-Min-Fe Fum-FA-DHA (PRENATAL MULTIVITAMIN PLUS DHA) 27-0.8-250 MG CAPS Take 1 tablet by mouth daily OK to substitute formulary equivalent 100 capsule 3     No facility-administered medications prior to visit.      History   Smoking Status     Never Smoker   Smokeless Tobacco     Never Used      Objective:  /72 (BP Location: Left arm, Patient Position: Sitting, Cuff Size: Adult Regular)   Pulse 109   Resp 24   Wt 85.3 kg (188 lb)   LMP 2022   BMI 37.40 kg/m    GENERAL: alert, not distressed  CHEST: clear, no rales, rhonchi, or wheezes  CARDIAC: regular without murmur, gallop, or rub  ABDOMEN: gravid, soft, non tender, non distended    No results found for this or any previous visit (from the past 24 hour(s)).   Assessment and Plan:   1. Encounter for supervision of other normal pregnancy in third trimester  - Urinalysis, OB Screen  - Group B strep PCR       Return in 1 week (on 9/15/2022) for prenatal care.   Future Appointments   Date Time Provider Department Center   9/15/2022 12:40 [facility]   2022 12:20 PM [facility]   2022 12:40 PM [facility]   10/6/2022 12:40 PM [facility]   10/13/2022 12:40 PM [facility]

## 2022-09-15 ENCOUNTER — PRENATAL OFFICE VISIT (OUTPATIENT)
Dept: FAMILY MEDICINE | Facility: CLINIC | Age: 27
End: 2022-09-15
Payer: COMMERCIAL

## 2022-09-15 VITALS
SYSTOLIC BLOOD PRESSURE: 99 MMHG | TEMPERATURE: 98.1 F | BODY MASS INDEX: 37.2 KG/M2 | WEIGHT: 187 LBS | RESPIRATION RATE: 20 BRPM | HEART RATE: 86 BPM | DIASTOLIC BLOOD PRESSURE: 60 MMHG

## 2022-09-15 DIAGNOSIS — Z34.83 ENCOUNTER FOR SUPERVISION OF OTHER NORMAL PREGNANCY IN THIRD TRIMESTER: ICD-10-CM

## 2022-09-15 PROCEDURE — 99207 PR PRENATAL VISIT: CPT | Performed by: FAMILY MEDICINE

## 2022-09-15 PROCEDURE — 81003 URINALYSIS AUTO W/O SCOPE: CPT | Performed by: FAMILY MEDICINE

## 2022-09-15 NOTE — PROGRESS NOTES
Subjective:  26 year old  at 36w2d  No ctx, lof, bleeding, or dc.  A little HA.  Not severe.  No vision changes.    Outpatient Medications Prior to Visit   Medication Sig Dispense Refill     ferrous gluconate (FERGON) 324 (38 Fe) MG tablet Take 1 tablet (324 mg) by mouth daily (with breakfast) 90 tablet 0     Prenatal MV-Min-Fe Fum-FA-DHA (PRENATAL MULTIVITAMIN PLUS DHA) 27-0.8-250 MG CAPS Take 1 tablet by mouth daily OK to substitute formulary equivalent 100 capsule 3     No facility-administered medications prior to visit.      History   Smoking Status     Never Smoker   Smokeless Tobacco     Never Used      Objective:  BP 99/60 (BP Location: Left arm, Patient Position: Sitting, Cuff Size: Adult Regular)   Pulse 86   Temp 98.1  F (36.7  C) (Oral)   Resp 20   Wt 84.8 kg (187 lb)   LMP 2022   BMI 37.20 kg/m    GENERAL: alert, not distressed  ABDOMEN: gravid, soft, non tender, non distended    No results found for this or any previous visit (from the past 24 hour(s)).   Assessment and Plan:   1. Encounter for supervision of normal pregnancy in third trimester  Doing well.  Discussed IOL at 39, potentially elective.  Patient will consider and talk to her .       Return in 1 month (on 10/15/2022) for prenatal care.   Future Appointments   Date Time Provider Department Center   2022 12:20 PM [facility]   2022 12:40 PM [facility]   10/6/2022 12:40 PM [facility]   10/13/2022 12:40 PM [facility]       
Nidhi Martinez  (RN)  2018 11:24:43

## 2022-09-22 ENCOUNTER — PRENATAL OFFICE VISIT (OUTPATIENT)
Dept: FAMILY MEDICINE | Facility: CLINIC | Age: 27
End: 2022-09-22
Payer: COMMERCIAL

## 2022-09-22 VITALS
DIASTOLIC BLOOD PRESSURE: 60 MMHG | TEMPERATURE: 98 F | RESPIRATION RATE: 18 BRPM | WEIGHT: 190 LBS | SYSTOLIC BLOOD PRESSURE: 96 MMHG | HEART RATE: 84 BPM | BODY MASS INDEX: 37.8 KG/M2

## 2022-09-22 DIAGNOSIS — Z34.83 ENCOUNTER FOR SUPERVISION OF OTHER NORMAL PREGNANCY IN THIRD TRIMESTER: Primary | ICD-10-CM

## 2022-09-22 DIAGNOSIS — O23.43 URINARY TRACT INFECTION IN MOTHER DURING THIRD TRIMESTER OF PREGNANCY: ICD-10-CM

## 2022-09-22 DIAGNOSIS — R10.11 RUQ ABDOMINAL PAIN: ICD-10-CM

## 2022-09-22 LAB
ALBUMIN UR-MCNC: NEGATIVE MG/DL
ALT SERPL W P-5'-P-CCNC: 9 U/L (ref 10–35)
APPEARANCE UR: CLEAR
AST SERPL W P-5'-P-CCNC: 17 U/L (ref 10–35)
BACTERIA #/AREA URNS HPF: ABNORMAL /HPF
BILIRUB UR QL STRIP: NEGATIVE
COLOR UR AUTO: YELLOW
ERYTHROCYTE [DISTWIDTH] IN BLOOD BY AUTOMATED COUNT: 13.3 % (ref 10–15)
GLUCOSE UR STRIP-MCNC: NEGATIVE MG/DL
HCT VFR BLD AUTO: 31.9 % (ref 35–47)
HGB BLD-MCNC: 10.6 G/DL (ref 11.7–15.7)
HGB UR QL STRIP: NEGATIVE
KETONES UR STRIP-MCNC: NEGATIVE MG/DL
LEUKOCYTE ESTERASE UR QL STRIP: ABNORMAL
MCH RBC QN AUTO: 28.2 PG (ref 26.5–33)
MCHC RBC AUTO-ENTMCNC: 33.2 G/DL (ref 31.5–36.5)
MCV RBC AUTO: 85 FL (ref 78–100)
NITRATE UR QL: NEGATIVE
PH UR STRIP: 7 [PH] (ref 5–8)
PLATELET # BLD AUTO: 159 10E3/UL (ref 150–450)
RBC # BLD AUTO: 3.76 10E6/UL (ref 3.8–5.2)
RBC #/AREA URNS AUTO: ABNORMAL /HPF
SP GR UR STRIP: 1.02 (ref 1–1.03)
SQUAMOUS #/AREA URNS AUTO: ABNORMAL /LPF
UROBILINOGEN UR STRIP-ACNC: 0.2 E.U./DL
WBC # BLD AUTO: 7.4 10E3/UL (ref 4–11)
WBC #/AREA URNS AUTO: ABNORMAL /HPF

## 2022-09-22 PROCEDURE — 36415 COLL VENOUS BLD VENIPUNCTURE: CPT | Performed by: FAMILY MEDICINE

## 2022-09-22 PROCEDURE — 99213 OFFICE O/P EST LOW 20 MIN: CPT | Performed by: FAMILY MEDICINE

## 2022-09-22 PROCEDURE — 87086 URINE CULTURE/COLONY COUNT: CPT | Performed by: FAMILY MEDICINE

## 2022-09-22 PROCEDURE — 85027 COMPLETE CBC AUTOMATED: CPT | Performed by: FAMILY MEDICINE

## 2022-09-22 PROCEDURE — 99207 PR PRENATAL VISIT: CPT | Performed by: FAMILY MEDICINE

## 2022-09-22 PROCEDURE — 81001 URINALYSIS AUTO W/SCOPE: CPT | Performed by: FAMILY MEDICINE

## 2022-09-22 PROCEDURE — 84460 ALANINE AMINO (ALT) (SGPT): CPT | Performed by: FAMILY MEDICINE

## 2022-09-22 PROCEDURE — 84450 TRANSFERASE (AST) (SGOT): CPT | Performed by: FAMILY MEDICINE

## 2022-09-22 RX ORDER — CEPHALEXIN 500 MG/1
500 CAPSULE ORAL 3 TIMES DAILY
Qty: 30 CAPSULE | Refills: 0 | Status: SHIPPED | OUTPATIENT
Start: 2022-09-22 | End: 2022-10-02

## 2022-09-22 NOTE — PROGRESS NOTES
Subjective:  26 year old  at 37w2d  No ctx, lof, bleeding, or dc.  No HA or vision changes.    Having some ruq pain.  Eating seems to influence it.  Has been eating low fat with known gallstones from 2016.  No fever.    Also had pyelonephritis in previous pregnancy.  Maybe some urinary symptoms    Outpatient Medications Prior to Visit   Medication Sig Dispense Refill     ferrous gluconate (FERGON) 324 (38 Fe) MG tablet Take 1 tablet (324 mg) by mouth daily (with breakfast) 90 tablet 0     Prenatal MV-Min-Fe Fum-FA-DHA (PRENATAL MULTIVITAMIN PLUS DHA) 27-0.8-250 MG CAPS Take 1 tablet by mouth daily OK to substitute formulary equivalent 100 capsule 3     No facility-administered medications prior to visit.      History   Smoking Status     Never Smoker   Smokeless Tobacco     Never Used      Objective:  BP 96/60 (BP Location: Left arm, Patient Position: Sitting, Cuff Size: Adult Regular)   Pulse 84   Temp 98  F (36.7  C) (Temporal)   Resp 18   Wt 86.2 kg (190 lb)   LMP 2022   BMI 37.80 kg/m    GENERAL: alert, not distressed  CHEST: clear, no rales, rhonchi, or wheezes  CARDIAC: regular without murmur, gallop, or rub  ABDOMEN: gravid, soft, non tender, non distended, mild tender Mcpherson's point    Recent Results (from the past 24 hour(s))   Urinalysis, OB Screen    Collection Time: 22 12:47 PM   Result Value Ref Range    Glucose Urine Negative Negative mg/dL    Ketones Urine Negative Negative mg/dL    Protein Albumin Urine Negative Negative mg/dL   UA with Microscopic - lab collect    Collection Time: 22 12:51 PM   Result Value Ref Range    Color Urine Yellow Colorless, Straw, Light Yellow, Yellow    Appearance Urine Clear Clear    Glucose Urine Negative Negative mg/dL    Bilirubin Urine Negative Negative    Ketones Urine Negative Negative mg/dL    Specific Gravity Urine 1.020 1.005 - 1.030    Blood Urine Negative Negative    pH Urine 7.0 5.0 - 8.0    Protein Albumin Urine Negative  Negative mg/dL    Urobilinogen Urine 0.2 0.2, 1.0 E.U./dL    Nitrite Urine Negative Negative    Leukocyte Esterase Urine Large (A) Negative   Urine Microscopic Exam    Collection Time: 09/22/22 12:51 PM   Result Value Ref Range    Bacteria Urine Moderate (A) None Seen /HPF    RBC Urine 0-2 0-2 /HPF /HPF    WBC Urine 5-10 (A) 0-5 /HPF /HPF    Squamous Epithelials Urine Moderate (A) None Seen /LPF   CBC with platelets    Collection Time: 09/22/22 12:52 PM   Result Value Ref Range    WBC Count 7.4 4.0 - 11.0 10e3/uL    RBC Count 3.76 (L) 3.80 - 5.20 10e6/uL    Hemoglobin 10.6 (L) 11.7 - 15.7 g/dL    Hematocrit 31.9 (L) 35.0 - 47.0 %    MCV 85 78 - 100 fL    MCH 28.2 26.5 - 33.0 pg    MCHC 33.2 31.5 - 36.5 g/dL    RDW 13.3 10.0 - 15.0 %    Platelet Count 159 150 - 450 10e3/uL      Assessment and Plan:   1. Encounter for supervision of other normal pregnancy in third trimester  - UA with Microscopic - lab collect  - Urine Microscopic Exam    2. RUQ abdominal pain  Check labs  Don't expect HELLP, but baseline labs helpful  Continue low fat diet.  Ideally, could deliver before gall bladder intervention, if needed.  - CBC with platelets  - AST  - ALT     3. Potential UTI preganncy  Will check urine culture with +LE  Treat now, as she has hx of pyelo with previous pregnancies.    Return in 1 week (on 9/29/2022) for prenatal care.   Future Appointments   Date Time Provider Department Center   9/29/2022 12:40 [facility]   10/6/2022 12:40 PM [facility]   10/13/2022 12:40 PM [facility]

## 2022-09-24 LAB — BACTERIA UR CULT: NORMAL

## 2022-09-29 ENCOUNTER — PRENATAL OFFICE VISIT (OUTPATIENT)
Dept: FAMILY MEDICINE | Facility: CLINIC | Age: 27
End: 2022-09-29
Payer: COMMERCIAL

## 2022-09-29 VITALS
RESPIRATION RATE: 21 BRPM | BODY MASS INDEX: 37.6 KG/M2 | WEIGHT: 189 LBS | HEART RATE: 83 BPM | DIASTOLIC BLOOD PRESSURE: 65 MMHG | SYSTOLIC BLOOD PRESSURE: 99 MMHG

## 2022-09-29 DIAGNOSIS — Z34.83 ENCOUNTER FOR SUPERVISION OF OTHER NORMAL PREGNANCY IN THIRD TRIMESTER: Primary | ICD-10-CM

## 2022-09-29 LAB
ALBUMIN UR-MCNC: NEGATIVE MG/DL
GLUCOSE UR STRIP-MCNC: NEGATIVE MG/DL
KETONES UR STRIP-MCNC: 15 MG/DL

## 2022-09-29 PROCEDURE — 99207 PR PRENATAL VISIT: CPT | Performed by: FAMILY MEDICINE

## 2022-09-29 PROCEDURE — 81003 URINALYSIS AUTO W/O SCOPE: CPT | Performed by: FAMILY MEDICINE

## 2022-09-29 NOTE — PROGRESS NOTES
Subjective:  26 year old  at 38w2d  No ctx, lof, bleeding, or dc.  No HA or vision changes.  She did not start antibiotic.  We discussed starting that.    Also has not been taking Fe or PNV.  Discussed Fe would be preferred at this time.    Better RUQ pain.  Diet controlled.  Known gallstone since 2016.    Baby active.    Outpatient Medications Prior to Visit   Medication Sig Dispense Refill     cephALEXin (KEFLEX) 500 MG capsule Take 1 capsule (500 mg) by mouth 3 times daily for 10 days 30 capsule 0     ferrous gluconate (FERGON) 324 (38 Fe) MG tablet Take 1 tablet (324 mg) by mouth daily (with breakfast) 90 tablet 0     Prenatal MV-Min-Fe Fum-FA-DHA (PRENATAL MULTIVITAMIN PLUS DHA) 27-0.8-250 MG CAPS Take 1 tablet by mouth daily OK to substitute formulary equivalent 100 capsule 3     No facility-administered medications prior to visit.      History   Smoking Status     Never Smoker   Smokeless Tobacco     Never Used      Objective:  BP 99/65 (BP Location: Left arm, Patient Position: Sitting, Cuff Size: Adult Large)   Pulse 83   Resp 21   Wt 85.7 kg (189 lb)   LMP 2022   BMI 37.60 kg/m    GENERAL: alert, not distressed  ABDOMEN: gravid, soft, non tender, non distended    Recent Results (from the past 24 hour(s))   Urinalysis, OB Screen    Collection Time: 22 12:54 PM   Result Value Ref Range    Glucose Urine Negative Negative mg/dL    Ketones Urine 15  (A) Negative mg/dL    Protein Albumin Urine Negative Negative mg/dL      Assessment and Plan:   1. Encounter for supervision of other normal pregnancy in third trimester  - Urinalysis, OB Screen  - US OB > 14 Weeks; Future     Hemoglobin   Date Value Ref Range Status   2022 10.6 (L) 11.7 - 15.7 g/dL Final   2022 10.8 (L) 11.7 - 15.7 g/dL Final     Fetal lie is uncertain from leopold's and cervical exam.  Question if unstable.  Breech on most recent ultrasound, but has been cephalic on clinical exam previously.  Will check  ultrasound.    Continue low fat diet.    Start Fe if she can tolerate it.  Would take cephalexin as well.    No follow-ups on file.   Future Appointments   Date Time Provider Department Center   9/30/2022  2:00 [facility]   10/6/2022 12:40 PM [facility]   10/13/2022 12:40 [facility]

## 2022-09-30 ENCOUNTER — HOSPITAL ENCOUNTER (OUTPATIENT)
Dept: ULTRASOUND IMAGING | Facility: HOSPITAL | Age: 27
Discharge: HOME | End: 2022-09-30
Attending: FAMILY MEDICINE | Admitting: FAMILY MEDICINE
Payer: COMMERCIAL

## 2022-09-30 DIAGNOSIS — Z34.83 ENCOUNTER FOR SUPERVISION OF OTHER NORMAL PREGNANCY IN THIRD TRIMESTER: ICD-10-CM

## 2022-09-30 PROCEDURE — 76815 OB US LIMITED FETUS(S): CPT

## 2022-10-02 ENCOUNTER — HEALTH MAINTENANCE LETTER (OUTPATIENT)
Age: 27
End: 2022-10-02

## 2022-10-06 ENCOUNTER — PRENATAL OFFICE VISIT (OUTPATIENT)
Dept: FAMILY MEDICINE | Facility: CLINIC | Age: 27
End: 2022-10-06
Payer: COMMERCIAL

## 2022-10-06 VITALS
RESPIRATION RATE: 20 BRPM | TEMPERATURE: 98.2 F | BODY MASS INDEX: 38.3 KG/M2 | SYSTOLIC BLOOD PRESSURE: 101 MMHG | HEART RATE: 81 BPM | WEIGHT: 192.5 LBS | DIASTOLIC BLOOD PRESSURE: 59 MMHG

## 2022-10-06 DIAGNOSIS — Z34.83 ENCOUNTER FOR SUPERVISION OF OTHER NORMAL PREGNANCY IN THIRD TRIMESTER: Primary | ICD-10-CM

## 2022-10-06 PROCEDURE — 99207 PR PRENATAL VISIT: CPT | Performed by: FAMILY MEDICINE

## 2022-10-06 PROCEDURE — 81003 URINALYSIS AUTO W/O SCOPE: CPT | Performed by: FAMILY MEDICINE

## 2022-10-13 ENCOUNTER — PRENATAL OFFICE VISIT (OUTPATIENT)
Dept: FAMILY MEDICINE | Facility: CLINIC | Age: 27
End: 2022-10-13
Payer: COMMERCIAL

## 2022-10-13 VITALS
WEIGHT: 188.5 LBS | RESPIRATION RATE: 20 BRPM | HEART RATE: 76 BPM | BODY MASS INDEX: 37.5 KG/M2 | DIASTOLIC BLOOD PRESSURE: 67 MMHG | TEMPERATURE: 97.6 F | SYSTOLIC BLOOD PRESSURE: 104 MMHG

## 2022-10-13 DIAGNOSIS — Z34.83 ENCOUNTER FOR SUPERVISION OF OTHER NORMAL PREGNANCY IN THIRD TRIMESTER: Primary | ICD-10-CM

## 2022-10-13 PROCEDURE — 99207 PR PRENATAL VISIT: CPT | Performed by: FAMILY MEDICINE

## 2022-10-14 NOTE — PROGRESS NOTES
Subjective:  27 year old  at 40w2d  No ctx, lof, bleeding, or dc.  No HA or vision changes.    Outpatient Medications Prior to Visit   Medication Sig Dispense Refill     ferrous gluconate (FERGON) 324 (38 Fe) MG tablet Take 1 tablet (324 mg) by mouth daily (with breakfast) 90 tablet 0     Prenatal MV-Min-Fe Fum-FA-DHA (PRENATAL MULTIVITAMIN PLUS DHA) 27-0.8-250 MG CAPS Take 1 tablet by mouth daily OK to substitute formulary equivalent 100 capsule 3     No facility-administered medications prior to visit.      History   Smoking Status     Never   Smokeless Tobacco     Never      Objective:  /67 (BP Location: Left arm, Patient Position: Sitting, Cuff Size: Adult Regular)   Pulse 76   Temp 97.6  F (36.4  C) (Temporal)   Resp 20   Wt 85.5 kg (188 lb 8 oz)   LMP 2022   BMI 37.50 kg/m    GENERAL: alert, not distressed  ABDOMEN: gravid, soft, non tender, non distended    No results found for this or any previous visit (from the past 24 hour(s)).   Assessment and Plan:   1. Encounter for supervision of normal pregnancy in third trimester  At her due date.  Cervix still not favorable.  Discussed options.  Recommended induction of labor at 42 weeks.  Patient would like that to be scheduled.  Discussed with the charge nurse and labor scheduled.  Instructions will be sent to patient via [employer].       Return in 1 week (on 10/20/2022) for prenatal care.   Future Appointments   Date Time Provider Department Center   10/18/2022  7:30 AM [facility]

## 2022-10-17 ENCOUNTER — LAB (OUTPATIENT)
Dept: URGENT CARE | Facility: URGENT CARE | Age: 27
End: 2022-10-17
Attending: FAMILY MEDICINE
Payer: COMMERCIAL

## 2022-10-17 DIAGNOSIS — Z34.83 ENCOUNTER FOR SUPERVISION OF OTHER NORMAL PREGNANCY IN THIRD TRIMESTER: ICD-10-CM

## 2022-10-17 LAB — SARS-COV-2 RNA RESP QL NAA+PROBE: NEGATIVE

## 2022-10-17 PROCEDURE — U0003 INFECTIOUS AGENT DETECTION BY NUCLEIC ACID (DNA OR RNA); SEVERE ACUTE RESPIRATORY SYNDROME CORONAVIRUS 2 (SARS-COV-2) (CORONAVIRUS DISEASE [COVID-19]), AMPLIFIED PROBE TECHNIQUE, MAKING USE OF HIGH THROUGHPUT TECHNOLOGIES AS DESCRIBED BY CMS-2020-01-R: HCPCS

## 2022-10-17 PROCEDURE — U0005 INFEC AGEN DETEC AMPLI PROBE: HCPCS

## 2022-10-18 ENCOUNTER — ANESTHESIA EVENT (OUTPATIENT)
Dept: OBGYN | Facility: HOSPITAL | Age: 27
End: 2022-10-18
Payer: COMMERCIAL

## 2022-10-18 ENCOUNTER — ANESTHESIA (OUTPATIENT)
Dept: OBGYN | Facility: HOSPITAL | Age: 27
End: 2022-10-18
Payer: COMMERCIAL

## 2022-10-18 PROBLEM — O48.0 POST-DATES PREGNANCY: Status: ACTIVE | Noted: 2022-10-18

## 2022-10-18 PROCEDURE — 250N000011 HC RX IP 250 OP 636: Performed by: ANESTHESIOLOGY

## 2022-10-18 PROCEDURE — 370N000003 HC ANESTHESIA WARD SERVICE

## 2022-10-18 RX ORDER — BUPIVACAINE HYDROCHLORIDE 2.5 MG/ML
INJECTION, SOLUTION EPIDURAL; INFILTRATION; INTRACAUDAL
Status: COMPLETED | OUTPATIENT
Start: 2022-10-18 | End: 2022-10-18

## 2022-10-18 RX ADMIN — BUPIVACAINE HYDROCHLORIDE 4 ML: 2.5 INJECTION, SOLUTION EPIDURAL; INFILTRATION; INTRACAUDAL at 12:40

## 2022-10-18 NOTE — ANESTHESIA PREPROCEDURE EVALUATION
Anesthesia Pre-Procedure Evaluation    Patient: Davey Rooney   MRN: [id number] : 1995        Procedure : * No procedures listed *  Cervical Ripening       Past Medical History:   Diagnosis Date     Acute pyelonephritis in third trimester, antepartum 2021     Asymptomatic bacteriuria during pregnancy 10/27/2015     Chorioamnionitis, delivered, current hospitalization 2016     Miscarriage 2014     Miscarriage 2015     Urinary tract infection      Vacuum extractor delivery, delivered 2016      Past Surgical History:   Procedure Laterality Date     WISDOM TOOTH EXTRACTION        No Known Allergies   Social History     Tobacco Use     Smoking status: Never     Smokeless tobacco: Never   Substance Use Topics     Alcohol use: Not Currently      Wt Readings from Last 1 Encounters:   10/18/22 85.5 kg (188 lb 8 oz)        Anesthesia Evaluation   Pt has had prior anesthetic. Type: Regional.    No history of anesthetic complications       ROS/MED HX  ENT/Pulmonary:    (-) asthma   Neurologic:       Cardiovascular:    (-) PIH   METS/Exercise Tolerance:     Hematologic:     (+) no anticoagulation therapy, no coagulopathy, no thrombocytopenia,  (-) anemia   Musculoskeletal:       GI/Hepatic:    (-) GERD   Renal/Genitourinary:       Endo:       Psychiatric/Substance Use:       Infectious Disease:       Malignancy:       Other:     (-) twin intrauterine pregnancy and placenta previa       Physical Exam    Airway        Mallampati: III   TM distance: > 3 FB      Respiratory Devices and Support         Dental           Cardiovascular             Pulmonary                   OUTSIDE LABS:  CBC:   Lab Results   Component Value Date    WBC 7.4 2022    WBC 8.4 2022    HGB 10.8 (L) 10/18/2022    HGB 10.6 (L) 2022    HCT 31.9 (L) 2022    HCT 36.1 2022     10/18/2022     2022     BMP:   Lab Results   Component Value Date     2021    POTASSIUM 3.3  (L) 03/02/2021    CHLORIDE 106 03/02/2021    CO2 19 (L) 03/02/2021    BUN 5 (L) 03/02/2021    CR 0.62 03/02/2021    CR 0.64 03/02/2021     COAGS:   Lab Results   Component Value Date    PTT 31 03/02/2021    INR 0.98 03/02/2021     POC:   Lab Results   Component Value Date    HCG Positive (A) 04/14/2022     HEPATIC:   Lab Results   Component Value Date    ALT 9 (L) 09/22/2022    AST 17 09/22/2022     OTHER:   Lab Results   Component Value Date    A1C 5.4 04/14/2022       Anesthesia Plan    ASA Status:  2      Anesthesia Type: Epidural.              Consents    Anesthesia Plan(s) and associated risks, benefits, and realistic alternatives discussed. Questions answered and patient/representative(s) expressed understanding.    - Discussed:     - Discussed with:  Patient         Postoperative Care            Comments:    Other Comments: Patient requests labor epidural. Chart reviewed, including labs. Reviewed options and risks with the patient, including but not limited to: bleeding, infection, damage to tissues under the skin(nerves, muscles, blood vessels), hypotension, headache, and epidural failure. Questions answered, consent signed. Patient agrees to elective labor epidural.             Tri Muller MD

## 2022-10-18 NOTE — ANESTHESIA PROCEDURE NOTES
"Epidural catheter Procedure Note    Pre-Procedure   Staff -        Anesthesiologist:  Tri Dominguez MD       Performed By: anesthesiologist       Location: OB       Procedure Start/Stop Times: 10/18/2022 12:30 PM and 10/18/2022 12:46 PM       Pre-Anesthestic Checklist: patient identified, IV checked, risks and benefits discussed, informed consent, monitors and equipment checked, pre-op evaluation, at physician/surgeon's request and post-op pain management  Timeout:       Correct Patient: Yes        Correct Procedure: Yes        Correct Site: Yes        Correct Position: Yes   Procedure Documentation  Procedure: epidural catheter       Patient Position: sitting       Skin prep: Chloraprep       Local skin infiltrated with mL of 2% lidocaine.        Insertion Site: L3-4. (midline approach).       Technique: LORT saline        PATY at 6 cm.       Needle Type: IguanaBee in [place] needle       Needle Gauge: 17.        Needle Length (Inches): 3.5        Catheter: 18 G.          Catheter threaded easily.         4 cm epidural space.         Threaded 10 cm at skin.         # of attempts: 1 and  # of redirects:  1    Assessment/Narrative         Paresthesias: No.       Test dose of 3 mL lidocaine 1.5% w/ 1:200,000 epinephrine at 00:35 CDT.         Test dose negative, 3 minutes after injection, for signs of intravascular, subdural, or intrathecal injection.       Insertion/Infusion Method: LORT saline       No aspiration negative for Heme or CSF via Epidural Catheter.       Sensory Level Left: T10.       Sensory Level Right: T10.    Medication(s) Administered   0.25% Bupivacaine PF (Epidural) - EPIDURAL   4 mL - 10/18/2022 12:40:00 PM  Medication Administration Time: 10/18/2022 12:30 PM      FOR [facility] ([facility] [place] ONLY:   Pain Team Contact information: please page the Pain Team Via [employer]. Search \"Pain\". During daytime hours, please page the attending first. At night please page the resident first.    "

## 2022-10-19 NOTE — ANESTHESIA POSTPROCEDURE EVALUATION
"Patient: Davey Rooney    Procedure: * No procedures listed *  Cervical Ripening    Anesthesia Type:  No value filed.    Note:  Disposition: Inpatient   Postop Pain Control:    PONV:    Neuro/Psych: Uneventful            Sign Out: Acceptable/Baseline neuro status   Airway/Respiratory: Uneventful            Sign Out: Acceptable/Baseline resp. status   CV/Hemodynamics: Uneventful            Sign Out: Acceptable CV status; No obvious hypovolemia; No obvious fluid overload   Other NRE: NONE   DID A NON-ROUTINE EVENT OCCUR? No    Event details/Postop Comments:  /67 (BP Location: Right arm)   Pulse 71   Temp 37.3  C (99.1  F) (Oral)   Resp 16   Ht 1.51 m (4' 11.45\")   Wt 85.5 kg (188 lb 8 oz)   LMP 01/04/2022   SpO2 99%   BMI 37.50 kg/m    CNS normal. No post dural puncture headache. No noted or reported complications of labor epidural.           Last vitals:  Vitals:    10/18/22 1636 10/18/22 1815 10/18/22 1942   BP: 114/57 114/57 112/67   Pulse:  74 71   Resp:  16 16   Temp:  (!) 35.8  C (96.5  F) 37.3  C (99.1  F)   SpO2:  99% 99%       Electronically Signed By: Jackie Pierce MD  October 18, 2022  10:38 PM  "

## 2022-10-24 ENCOUNTER — MEDICAL CORRESPONDENCE (OUTPATIENT)
Dept: HEALTH INFORMATION MANAGEMENT | Facility: CLINIC | Age: 27
End: 2022-10-24

## 2022-11-29 ENCOUNTER — PRENATAL OFFICE VISIT (OUTPATIENT)
Dept: FAMILY MEDICINE | Facility: CLINIC | Age: 27
End: 2022-11-29
Payer: COMMERCIAL

## 2022-11-29 VITALS
DIASTOLIC BLOOD PRESSURE: 72 MMHG | HEART RATE: 60 BPM | WEIGHT: 175 LBS | HEIGHT: 60 IN | TEMPERATURE: 97.8 F | RESPIRATION RATE: 16 BRPM | OXYGEN SATURATION: 99 % | BODY MASS INDEX: 34.36 KG/M2 | SYSTOLIC BLOOD PRESSURE: 108 MMHG

## 2022-11-29 DIAGNOSIS — Z30.011 ENCOUNTER FOR INITIAL PRESCRIPTION OF CONTRACEPTIVE PILLS: ICD-10-CM

## 2022-11-29 PROCEDURE — 90471 IMMUNIZATION ADMIN: CPT | Performed by: FAMILY MEDICINE

## 2022-11-29 PROCEDURE — 99207 PR POST PARTUM EXAM: CPT | Performed by: FAMILY MEDICINE

## 2022-11-29 PROCEDURE — 90686 IIV4 VACC NO PRSV 0.5 ML IM: CPT | Performed by: FAMILY MEDICINE

## 2022-11-29 RX ORDER — NORGESTIMATE AND ETHINYL ESTRADIOL 0.25-0.035
1 KIT ORAL DAILY
Qty: 84 TABLET | Refills: 1 | Status: SHIPPED | OUTPATIENT
Start: 2022-11-29

## 2022-11-29 NOTE — PROGRESS NOTES
Post Partum Check:    Delivery at 41w0d now .  Date of delivery: 10/18/22    Patient concerns: none    Bleeding: no concerns    Pain: no concerns    LMP:Patient's last menstrual period was 2022.     Depression: no concerns  EPDS Score: 0      Contraception Plan:  OCPs.  Had previously been interested in IUD, but now declines that.    Immunizations needed:  Flu  She declined covid.    Pap smear:  Due 2024    Assessment:  Well post partum check up.    Plan:  1. Routine postpartum follow-up  - INFLUENZA VACCINE IM > 6 MONTHS VALENT IIV4 (AFLURIA/FLUZONE)    2. Encounter for initial prescription of contraceptive pills  Use discussed.  No migraines.  No hx of VTE or family hx.  - norgestimate-ethinyl estradiol (ORTHO-CYCLEN) 0.25-35 MG-MCG tablet; Take 1 tablet by mouth daily  Dispense: 84 tablet; Refill: 1

## 2023-02-09 ENCOUNTER — MEDICAL CORRESPONDENCE (OUTPATIENT)
Dept: HEALTH INFORMATION MANAGEMENT | Facility: CLINIC | Age: 28
End: 2023-02-09

## 2023-03-23 ENCOUNTER — MYC MEDICAL ADVICE (OUTPATIENT)
Dept: FAMILY MEDICINE | Facility: CLINIC | Age: 28
End: 2023-03-23
Payer: COMMERCIAL

## 2023-03-24 NOTE — TELEPHONE ENCOUNTER
Called mom - she brought both children to [facility] last night. Denies further needs/concerns. No further action needed from triage RN.

## 2023-05-15 ENCOUNTER — E-VISIT (OUTPATIENT)
Dept: FAMILY MEDICINE | Facility: CLINIC | Age: 28
End: 2023-05-15
Payer: COMMERCIAL

## 2023-05-15 DIAGNOSIS — M54.50 LOW BACK PAIN WITHOUT SCIATICA, UNSPECIFIED BACK PAIN LATERALITY, UNSPECIFIED CHRONICITY: Primary | ICD-10-CM

## 2023-05-15 PROCEDURE — 99421 OL DIG E/M SVC 5-10 MIN: CPT | Performed by: FAMILY MEDICINE

## 2023-05-15 RX ORDER — IBUPROFEN 600 MG/1
600 TABLET, FILM COATED ORAL EVERY 8 HOURS
Qty: 21 TABLET | Refills: 0 | Status: SHIPPED | OUTPATIENT
Start: 2023-05-15 | End: 2023-05-22

## 2023-05-15 NOTE — PATIENT INSTRUCTIONS
Thank you for choosing us for your care. I have placed an order for a prescription so that you can start treatment. View your full visit summary for details by clicking on the link below. Your pharmacist will able to address any questions you may have about the medication.      If you're not feeling better within 2-3 weeks please schedule an appointment.  You can schedule an appointment right here in [facility], or call [phone]  If the visit is for the same symptoms as your eVisit, we'll refund the cost of your eVisit if seen within seven days.    Caring for Your Back    You are not alone.    Low back pain is very common. Nearly half of all adults will have low back pain in any given year. The good news is that back pain is rarely a danger to your health. Most people can manage their back pain on their own. About half of people start feeling better within two weeks. In 9 out of 10 cases, low back pain goes away or no longer limits daily activity within 6 weeks.     Your outlook is good!     Your symptoms tell us that your low back pain is most likely not a danger to you. Most of the time we will not know the exact cause of low back pain, even if you see a doctor or have an MRI. However, treatment can still work without knowing the cause of the pain. Less than 1 in 100 people need surgery for their back pain.     What can I do about my low back pain?     There are three basic things you can do to ease low back pain and help it go away.     Use heat or cold packs.    Take medicine as directed.    Use positions, movements and exercises.    Using heat or cold packs:    Try cold packs or gentle heat to ease your pain.  Use whichever gives you the most relief. Apply the cold pack or heat for 15 minutes at a time, as often as needed.    Taking medicine:    If taking over-the-counter medicine:    Take ibuprofen (Advil, Motrin) 600 mg three times a day as needed for pain.  OR    Take Aleve (naproxen) 220 to 440 mg two  times a day as needed for pain. If your doctor prescribed a muscle relaxant (cyclobenzaprine 10 mg.):    Take   to 1 tablet at bedtime.    Do not drive when taking this medicine. This drug may make you sleepy.     Using positions, movements and exercises:    Research tells us that moving your joints and muscles can help you recover from back pain. Such activity should be simple and gentle. Use the positions below as well as walking to help relieve your pain. Try taking a short walk every 3 to 4 hours during the day. Walk for a few minutes inside your home or take longer walks outside, on a treadmill or at a mall. Slowly increase the amount of time you walk. Expect discomfort when you begin, but it should lessen as your back starts to heal. When your back feels better, walk daily to keep your back and body healthy.    Finding a position that is comfortable:    When your back pain is new, certain positions will ease your pain. Gently try each of the positions below until you find one that is helpful. Once you find a position of comfort, use it as often as you like when you are resting. You will recover faster if you combine rest with activity.    * Lie on your back with your legs bent. You can do this by placing a pillow under your knees or lie on the floor and rest your lower legs on the seat of a chair.  * Lie on your side with your knees bent and place a pillow between your knees.    Lie on your stomach over pillows.       When should I call my doctor?    Your back pain should improve over the first couple of weeks. As it improves, you should be able to return to your normal activities.  But call your doctor if:      You have a sudden change in your ability to control your bladder or bowels.    You begin to feel tingling in your groin or legs.    The pain spreads down your leg and into your foot.    Your toes, feet or leg muscles begin to feel weak.    You feel generally unwell or sick.    Your pain gets  worse.    If you are deaf or hard of hearing, please let us know. We provide many free services including sign language interpreters, oral interpreters, TTYs, telephone amplifiers, note takers and written materials.    For informational purposes only. Not to replace the advice of your health care provider. Copyright   2013 [facility]. All rights reserved. [employer] 718008 - 04/13.    Mini Relaxation Exercises  Source [email or web address].org    Mini relaxation exercises are focused breathing techniques that help reduce  anxiety and tension immediately. You can do them with your eyes open or  closed. You can do them any place, at any time; no one will know that you are  doing them.    Good Times to Do a  Mini     Before taking an exam    While at the computer lab waiting for your document to print    While waiting in line    When someone says something that bothers you    While waiting for the announcement of a grade    While waiting for a phone call    In the dentist s chair    When you feel overwhelmed by what you need to accomplish in the near  future    When in pain    When you want to     Mini Version 1- Breath Focused  Position yourself in a supportive comfortable chair or lying in a position that is least painful. (Often this is on your back with pillows under your knees)    a. Count very slowly to yourself from ten down to zero, one number for  each breath. Thus, with the first diaphragmatic breath, you say  ten  to  yourself, with the next breath, you say  nine , etc. If you start feeling  light-headed or dizzy, slow down the counting. When you get to  zero ,  see how you are feeling. If you are feeling better, great! If not, try to  do it again.    b. As you inhale, count very slowly up to four; as you exhale, count slowly  back down to one. Thus, as you inhale, you say to yourself  one, two,  three, four , as you exhale, you say to yourself  four, three, two, one .  Do this several times.    c.  After each inhalation, pause for a few seconds; after you exhale, pause  again for a few seconds. Do this for several breaths.    Mini Version 2- Physical Focused  a. Massage your forehead, jaw, back of neck, and shoulders  b. Stretch and yawn  c. Walk counting four paces as you breathe in and four paces as you  breathe out  d. Coordinate your breath with any activity, for example, writing, jogging,  drawing, lifting weights, walking, etc.    Mini Version 3- Imagery Focused  Position yourself in a supportive comfortable chair or lying in a position that is least painful. (Often this is on your back with pillows under your knees)    a. Briefly picture yourself in a place you find relaxing  b. Contemplate a picture of a natural scene  c. Imagine the characteristics of one of the following or any other object  which portrays characteristics you find calming or supportive in the  moment:    Tree (strong, rooted, expansive)    Mountain (timeless, strong, stable)    Waves (fluid, limitless)    Sun (radiant, warm)    Wind (free)    Mini Version 4- Mindfulness Focused  a. Look out the window for a few moments  b. Notice something new  c. Listen. What is the most distant sound you hear? The next distant?  d. Appreciate the sensation of being in the situation, the feel, smell, sight of  certain objects  e. Focus on being exactly where you are, keeping your thoughts from flowing  into the future or past

## 2023-08-12 ENCOUNTER — HEALTH MAINTENANCE LETTER (OUTPATIENT)
Age: 28
End: 2023-08-12

## 2023-09-18 ENCOUNTER — OFFICE VISIT (OUTPATIENT)
Dept: URGENT CARE | Facility: URGENT CARE | Age: 28
End: 2023-09-18
Payer: COMMERCIAL

## 2023-09-18 VITALS
OXYGEN SATURATION: 97 % | HEART RATE: 88 BPM | TEMPERATURE: 99 F | WEIGHT: 171.8 LBS | DIASTOLIC BLOOD PRESSURE: 73 MMHG | SYSTOLIC BLOOD PRESSURE: 117 MMHG | BODY MASS INDEX: 33.55 KG/M2

## 2023-09-18 DIAGNOSIS — R30.0 DYSURIA: ICD-10-CM

## 2023-09-18 DIAGNOSIS — N30.01 ACUTE CYSTITIS WITH HEMATURIA: Primary | ICD-10-CM

## 2023-09-18 LAB
ALBUMIN UR-MCNC: NEGATIVE MG/DL
APPEARANCE UR: ABNORMAL
BACTERIA #/AREA URNS HPF: ABNORMAL /HPF
BILIRUB UR QL STRIP: NEGATIVE
COLOR UR AUTO: YELLOW
GLUCOSE UR STRIP-MCNC: NEGATIVE MG/DL
HGB UR QL STRIP: ABNORMAL
KETONES UR STRIP-MCNC: NEGATIVE MG/DL
LEUKOCYTE ESTERASE UR QL STRIP: ABNORMAL
MUCOUS THREADS #/AREA URNS LPF: PRESENT /LPF
NITRATE UR QL: NEGATIVE
PH UR STRIP: 6 [PH] (ref 5–7)
RBC #/AREA URNS AUTO: >100 /HPF
SP GR UR STRIP: 1.01 (ref 1–1.03)
UROBILINOGEN UR STRIP-ACNC: 0.2 E.U./DL
WBC #/AREA URNS AUTO: ABNORMAL /HPF
WBC CLUMPS #/AREA URNS HPF: PRESENT /HPF

## 2023-09-18 PROCEDURE — 87086 URINE CULTURE/COLONY COUNT: CPT | Performed by: PHYSICIAN ASSISTANT

## 2023-09-18 PROCEDURE — 81001 URINALYSIS AUTO W/SCOPE: CPT | Performed by: PHYSICIAN ASSISTANT

## 2023-09-18 PROCEDURE — 87186 SC STD MICRODIL/AGAR DIL: CPT | Performed by: PHYSICIAN ASSISTANT

## 2023-09-18 PROCEDURE — 99213 OFFICE O/P EST LOW 20 MIN: CPT | Performed by: PHYSICIAN ASSISTANT

## 2023-09-18 RX ORDER — SULFAMETHOXAZOLE/TRIMETHOPRIM 800-160 MG
1 TABLET ORAL 2 TIMES DAILY
Qty: 12 TABLET | Refills: 0 | Status: SHIPPED | OUTPATIENT
Start: 2023-09-18 | End: 2023-09-24

## 2023-09-19 NOTE — PROGRESS NOTES
Assessment & Plan     Acute cystitis with hematuria  - sulfamethoxazole-trimethoprim (BACTRIM DS) 800-160 MG tablet; Take 1 tablet by mouth 2 times daily for 6 days    Dysuria  - UA Macroscopic with reflex to Microscopic and Culture - Lab Collect; Future  - UA Macroscopic with reflex to Microscopic and Culture - Lab Collect  - Urine Microscopic Exam  - Urine Culture    UA with large blood and moderate leukocyte estrace, microscopy shows greater than 100 red blood cells and 10-25 white blood cells with clumps present.  Questionable right-sided flank pain.  We will treat with Bactrim twice daily for 6 days.  Follow-up to be seen if symptoms worsen or fail to improve.  Adjust plan as needed based on urine culture result.    Return in about 1 week (around 9/25/2023) for visit with primary care provider if not improving.     Bethanie Deng PA-C  [facility]    Subjective   Davey Rooney is a 27 year old who presents for the following health issues     Patient presents with:  Urgent Care  UTI: Think have uti, pain and seeing blood, noticed it today     HPI    Davey presents clinic today for presumed urinary tract infection.  Symptoms first began earlier today with urinary frequency, dysuria and blood in her urine.  She has some cramping over her bladder and questionable low back/flank pain.  No nausea, vomiting, fevers.  Last UTI during pregnancy approximately 1 year ago.    Review of Systems   ROS negative except as stated above.      Objective    /73 (BP Location: Left arm, Patient Position: Sitting, Cuff Size: Adult Regular)   Pulse 88   Temp 99  F (37.2  C) (Oral)   Wt 77.9 kg (171 lb 12.8 oz)   SpO2 97%   BMI 33.55 kg/m    Physical Exam   GENERAL: healthy, alert and no distress  EYES: Eyes grossly normal to inspection, PERRL and conjunctivae and sclerae normal  NECK: no adenopathy, no asymmetry, masses, or scars and thyroid normal to palpation  RESP: lungs clear to auscultation - no  rales, rhonchi or wheezes  CV: regular rate and rhythm, normal S1 S2, no S3 or S4, no murmur, click or rub, no peripheral edema and peripheral pulses strong  ABDOMEN: soft, mild suprapubic tenderness to palpation  BACK: no CVA tenderness, no paralumbar tenderness    Results for orders placed or performed in visit on 09/18/23   UA Macroscopic with reflex to Microscopic and Culture - Lab Collect     Status: Abnormal    Specimen: Urine, Midstream   Result Value Ref Range    Color Urine Yellow Colorless, Straw, Light Yellow, Yellow    Appearance Urine Slightly Cloudy (A) Clear    Glucose Urine Negative Negative mg/dL    Bilirubin Urine Negative Negative    Ketones Urine Negative Negative mg/dL    Specific Gravity Urine 1.010 1.003 - 1.035    Blood Urine Large (A) Negative    pH Urine 6.0 5.0 - 7.0    Protein Albumin Urine Negative Negative mg/dL    Urobilinogen Urine 0.2 0.2, 1.0 E.U./dL    Nitrite Urine Negative Negative    Leukocyte Esterase Urine Moderate (A) Negative   Urine Microscopic Exam     Status: Abnormal   Result Value Ref Range    Bacteria Urine Moderate (A) None Seen /HPF    RBC Urine >100 (A) 0-2 /HPF /HPF    WBC Urine 10-25 (A) 0-5 /HPF /HPF    WBC Clumps Urine Present (A) None Seen /HPF    Mucus Urine Present (A) None Seen /LPF

## 2023-09-21 LAB — BACTERIA UR CULT: ABNORMAL

## 2023-10-23 ENCOUNTER — TRANSFERRED RECORDS (OUTPATIENT)
Dept: HEALTH INFORMATION MANAGEMENT | Facility: CLINIC | Age: 28
End: 2023-10-23
Payer: COMMERCIAL

## 2023-10-25 ENCOUNTER — TRANSFERRED RECORDS (OUTPATIENT)
Dept: HEALTH INFORMATION MANAGEMENT | Facility: CLINIC | Age: 28
End: 2023-10-25
Payer: COMMERCIAL

## 2024-10-05 ENCOUNTER — HEALTH MAINTENANCE LETTER (OUTPATIENT)
Age: 29
End: 2024-10-05

## 2024-10-24 ENCOUNTER — TRANSFERRED RECORDS (OUTPATIENT)
Dept: HEALTH INFORMATION MANAGEMENT | Facility: CLINIC | Age: 29
End: 2024-10-24